# Patient Record
Sex: FEMALE | Race: BLACK OR AFRICAN AMERICAN | NOT HISPANIC OR LATINO
[De-identification: names, ages, dates, MRNs, and addresses within clinical notes are randomized per-mention and may not be internally consistent; named-entity substitution may affect disease eponyms.]

---

## 2017-01-05 ENCOUNTER — APPOINTMENT (OUTPATIENT)
Dept: OBGYN | Facility: CLINIC | Age: 47
End: 2017-01-05

## 2017-01-05 VITALS
OXYGEN SATURATION: 98 % | WEIGHT: 183 LBS | HEIGHT: 64 IN | HEART RATE: 72 BPM | RESPIRATION RATE: 16 BRPM | DIASTOLIC BLOOD PRESSURE: 72 MMHG | SYSTOLIC BLOOD PRESSURE: 110 MMHG | BODY MASS INDEX: 31.24 KG/M2

## 2017-01-17 ENCOUNTER — APPOINTMENT (OUTPATIENT)
Dept: SURGERY | Facility: CLINIC | Age: 47
End: 2017-01-17

## 2017-01-17 VITALS
DIASTOLIC BLOOD PRESSURE: 78 MMHG | HEIGHT: 64 IN | WEIGHT: 183 LBS | HEART RATE: 70 BPM | SYSTOLIC BLOOD PRESSURE: 119 MMHG | BODY MASS INDEX: 31.24 KG/M2 | RESPIRATION RATE: 15 BRPM

## 2017-01-28 ENCOUNTER — OUTPATIENT (OUTPATIENT)
Dept: OUTPATIENT SERVICES | Facility: HOSPITAL | Age: 47
LOS: 1 days | End: 2017-01-28
Payer: COMMERCIAL

## 2017-01-28 ENCOUNTER — APPOINTMENT (OUTPATIENT)
Dept: ULTRASOUND IMAGING | Facility: IMAGING CENTER | Age: 47
End: 2017-01-28

## 2017-01-28 DIAGNOSIS — Z00.8 ENCOUNTER FOR OTHER GENERAL EXAMINATION: ICD-10-CM

## 2017-01-28 PROCEDURE — 76882 US LMTD JT/FCL EVL NVASC XTR: CPT

## 2017-03-06 ENCOUNTER — OUTPATIENT (OUTPATIENT)
Dept: OUTPATIENT SERVICES | Facility: HOSPITAL | Age: 47
LOS: 1 days | End: 2017-03-06
Payer: COMMERCIAL

## 2017-03-06 VITALS
SYSTOLIC BLOOD PRESSURE: 122 MMHG | HEIGHT: 62.5 IN | TEMPERATURE: 98 F | HEART RATE: 67 BPM | DIASTOLIC BLOOD PRESSURE: 80 MMHG | WEIGHT: 184.09 LBS | RESPIRATION RATE: 16 BRPM

## 2017-03-06 DIAGNOSIS — D17.30 BENIGN LIPOMATOUS NEOPLASM OF SKIN AND SUBCUTANEOUS TISSUE OF UNSPECIFIED SITES: ICD-10-CM

## 2017-03-06 DIAGNOSIS — I26.99 OTHER PULMONARY EMBOLISM WITHOUT ACUTE COR PULMONALE: ICD-10-CM

## 2017-03-06 DIAGNOSIS — Z98.890 OTHER SPECIFIED POSTPROCEDURAL STATES: Chronic | ICD-10-CM

## 2017-03-06 DIAGNOSIS — K21.9 GASTRO-ESOPHAGEAL REFLUX DISEASE WITHOUT ESOPHAGITIS: ICD-10-CM

## 2017-03-06 LAB
BUN SERPL-MCNC: 15 MG/DL — SIGNIFICANT CHANGE UP (ref 7–23)
CALCIUM SERPL-MCNC: 9.5 MG/DL — SIGNIFICANT CHANGE UP (ref 8.4–10.5)
CHLORIDE SERPL-SCNC: 101 MMOL/L — SIGNIFICANT CHANGE UP (ref 98–107)
CO2 SERPL-SCNC: 23 MMOL/L — SIGNIFICANT CHANGE UP (ref 22–31)
CREAT SERPL-MCNC: 0.83 MG/DL — SIGNIFICANT CHANGE UP (ref 0.5–1.3)
GLUCOSE SERPL-MCNC: 101 MG/DL — HIGH (ref 70–99)
HCG SERPL-ACNC: < 5 MIU/ML — SIGNIFICANT CHANGE UP
HCT VFR BLD CALC: 40.3 % — SIGNIFICANT CHANGE UP (ref 34.5–45)
HGB BLD-MCNC: 13.5 G/DL — SIGNIFICANT CHANGE UP (ref 11.5–15.5)
MCHC RBC-ENTMCNC: 30.3 PG — SIGNIFICANT CHANGE UP (ref 27–34)
MCHC RBC-ENTMCNC: 33.5 % — SIGNIFICANT CHANGE UP (ref 32–36)
MCV RBC AUTO: 90.6 FL — SIGNIFICANT CHANGE UP (ref 80–100)
PLATELET # BLD AUTO: 297 K/UL — SIGNIFICANT CHANGE UP (ref 150–400)
PMV BLD: 11.6 FL — SIGNIFICANT CHANGE UP (ref 7–13)
POTASSIUM SERPL-MCNC: 3.9 MMOL/L — SIGNIFICANT CHANGE UP (ref 3.5–5.3)
POTASSIUM SERPL-SCNC: 3.9 MMOL/L — SIGNIFICANT CHANGE UP (ref 3.5–5.3)
RBC # BLD: 4.45 M/UL — SIGNIFICANT CHANGE UP (ref 3.8–5.2)
RBC # FLD: 12.8 % — SIGNIFICANT CHANGE UP (ref 10.3–14.5)
SODIUM SERPL-SCNC: 140 MMOL/L — SIGNIFICANT CHANGE UP (ref 135–145)
WBC # BLD: 6.39 K/UL — SIGNIFICANT CHANGE UP (ref 3.8–10.5)
WBC # FLD AUTO: 6.39 K/UL — SIGNIFICANT CHANGE UP (ref 3.8–10.5)

## 2017-03-06 PROCEDURE — 93010 ELECTROCARDIOGRAM REPORT: CPT

## 2017-03-06 NOTE — H&P PST ADULT - PROBLEM SELECTOR PLAN 1
Dilation Curettage Hysteroscopy, HTA Ablation 11/14/16.     CBC BMP PT/PTT HCG EKG    Pt to see PMD for clearance, requested on chart Scheduled for Excision lipoma of right lower back on 3/17/2017.  Preop instructions provided and pt verbalizes understanding.  Labs done and results pending.  Hibiclens provided with instructions.

## 2017-03-06 NOTE — H&P PST ADULT - PMH
Anemia  has been prescribed slow release iron but is non-compliant due to inability to tolerate  DVT (deep venous thrombosis)    GERD (gastroesophageal reflux disease)  no meds  Obesity    Pulmonary embolism, bilateral  2014.

## 2017-03-06 NOTE — H&P PST ADULT - FAMILY HISTORY
Father  Still living? No  Family history of heart disease, Age at diagnosis: Age Unknown     Mother  Still living? Yes, Estimated age: Age Unknown  Family history of diabetes mellitus, Age at diagnosis: Age Unknown

## 2017-03-06 NOTE — H&P PST ADULT - NSANTHOSAYNRD_GEN_A_CORE
No. DANIELLE screening performed.  STOP BANG Legend: 0-2 = LOW Risk; 3-4 = INTERMEDIATE Risk; 5-8 = HIGH Risk

## 2017-03-06 NOTE — H&P PST ADULT - ASSESSMENT
47 y/o female with history of GERD, PE/DVT (dx in Dec.2014). Pt with hx of fibroids x since age 24, heavy menstruation and anemia. Per pt fibroids had enlarged and she had been receiving Lupron injections, Fibroid decreased in size. Per pt, surgery will be performed so that fibroids do not enlarge when Lupron is discontinued.   Pt now scheduled for Dilation Curettage Hysteroscopy, HTA Ablation 11/14/16. Benign lipomatous neoplasm of skin and subcutaneous tissue

## 2017-03-06 NOTE — H&P PST ADULT - HISTORY OF PRESENT ILLNESS
45 y/o female with history of GERD, PE/DVT (dx in Dec.2014). Pt with hx of fibroids x since age 24, heavy menstruation and anemia. Per pt fibroids had enlarged and she had been receiving Lupron injections, Fibroid decreased in size. Per pt, surgery will be performed so that fibroids do not enlarge when Lupron is discontinued.   Pt now scheduled for Dilation Curettage Hysteroscopy, HTA Ablation 11/14/16. 45 y/o female with history of GERD, PE/DVT (dx in Dec.2014) and was treated with Warfarin until may 2016. Patient presents with preop dx of benign lipomatous neoplasm of skin and subcutaneous tissue presents to have PST eval for Excision lipoma of right lower back scheduled on 3/17/2017.

## 2017-03-06 NOTE — H&P PST ADULT - PSYCHIATRIC
details… Affect and characteristics of appearance, verbalizations, behaviors are appropriate detailed exam

## 2017-03-06 NOTE — H&P PST ADULT - RS GEN PE MLT RESP DETAILS PC
no rales/respirations non-labored/clear to auscultation bilaterally/no wheezes/no rhonchi clear to auscultation bilaterally/no rhonchi/breath sounds equal/no rales/respirations non-labored/airway patent/no wheezes

## 2017-03-06 NOTE — H&P PST ADULT - NEUROLOGICAL DETAILS
cranial nerves intact/alert and oriented x 3/sensation intact sensation intact/responds to pain/responds to verbal commands/alert and oriented x 3/cranial nerves intact

## 2017-03-16 ENCOUNTER — RESULT REVIEW (OUTPATIENT)
Age: 47
End: 2017-03-16

## 2017-03-17 ENCOUNTER — OUTPATIENT (OUTPATIENT)
Dept: OUTPATIENT SERVICES | Facility: HOSPITAL | Age: 47
LOS: 1 days | Discharge: HOME | End: 2017-03-17
Payer: COMMERCIAL

## 2017-03-17 VITALS
SYSTOLIC BLOOD PRESSURE: 101 MMHG | TEMPERATURE: 98 F | DIASTOLIC BLOOD PRESSURE: 49 MMHG | OXYGEN SATURATION: 98 % | HEART RATE: 68 BPM | RESPIRATION RATE: 16 BRPM

## 2017-03-17 VITALS
OXYGEN SATURATION: 100 % | HEIGHT: 62.5 IN | HEART RATE: 69 BPM | RESPIRATION RATE: 16 BRPM | WEIGHT: 184.09 LBS | DIASTOLIC BLOOD PRESSURE: 77 MMHG | TEMPERATURE: 99 F | SYSTOLIC BLOOD PRESSURE: 116 MMHG

## 2017-03-17 DIAGNOSIS — Z98.890 OTHER SPECIFIED POSTPROCEDURAL STATES: Chronic | ICD-10-CM

## 2017-03-17 DIAGNOSIS — D17.30 BENIGN LIPOMATOUS NEOPLASM OF SKIN AND SUBCUTANEOUS TISSUE OF UNSPECIFIED SITES: ICD-10-CM

## 2017-03-17 PROCEDURE — 88304 TISSUE EXAM BY PATHOLOGIST: CPT | Mod: 26

## 2017-03-17 PROCEDURE — 11406 EXC TR-EXT B9+MARG >4.0 CM: CPT | Mod: GC

## 2017-03-17 NOTE — ASU DISCHARGE PLAN (ADULT/PEDIATRIC). - NOTIFY
Numbness, color, or temperature change to extremity/Persistent Nausea and Vomiting/Pain not relieved by Medications/Swelling that continues/Unable to Urinate/Fever greater than 101/Bleeding that does not stop

## 2017-03-18 ENCOUNTER — TRANSCRIPTION ENCOUNTER (OUTPATIENT)
Age: 47
End: 2017-03-18

## 2017-03-20 ENCOUNTER — EMERGENCY (EMERGENCY)
Facility: HOSPITAL | Age: 47
LOS: 1 days | Discharge: HOME | End: 2017-03-20
Attending: EMERGENCY MEDICINE | Admitting: EMERGENCY MEDICINE
Payer: COMMERCIAL

## 2017-03-20 VITALS
HEART RATE: 59 BPM | RESPIRATION RATE: 18 BRPM | DIASTOLIC BLOOD PRESSURE: 61 MMHG | TEMPERATURE: 98 F | OXYGEN SATURATION: 99 % | SYSTOLIC BLOOD PRESSURE: 134 MMHG | WEIGHT: 184.97 LBS | HEIGHT: 64 IN

## 2017-03-20 VITALS
DIASTOLIC BLOOD PRESSURE: 88 MMHG | RESPIRATION RATE: 18 BRPM | OXYGEN SATURATION: 99 % | SYSTOLIC BLOOD PRESSURE: 138 MMHG | HEART RATE: 70 BPM

## 2017-03-20 DIAGNOSIS — Z98.890 OTHER SPECIFIED POSTPROCEDURAL STATES: Chronic | ICD-10-CM

## 2017-03-20 PROCEDURE — 99284 EMERGENCY DEPT VISIT MOD MDM: CPT | Mod: 25

## 2017-03-20 PROCEDURE — 93971 EXTREMITY STUDY: CPT | Mod: 26

## 2017-03-20 PROCEDURE — 93971 EXTREMITY STUDY: CPT

## 2017-03-20 PROCEDURE — 99284 EMERGENCY DEPT VISIT MOD MDM: CPT

## 2017-03-20 NOTE — ED PROVIDER NOTE - PHYSICAL EXAMINATION
Gen: NAD  Eyes:  sclerae white, no icterus  ENT: Moist mucous membranes. No exudates  Neck: supple, no LAD, mass or goiter, trachea midline  CV: RRR. Audible S1 and S2. No murmurs, rubs, gallops, S3, nor S4  Pulm: Clear to auscultation bilaterally. No wheezes, rales, or rhonchi  Abd: BS+, nondistended, No tenderness to palpation  Musculoskeletal:  L. calf TTP, not swollen, not erythematous  Skin: no lesions or scars noted  Psych: mood good, affect full range and congruent with mood.  Neurologic: AAOx3

## 2017-03-20 NOTE — ED PROVIDER NOTE - OBJECTIVE STATEMENT
47 y/o F w/ Hx of DVT and PE in 2014, not on anticoag, p/w L. calf pain and swelling x 3 days. No SOB, cough, CP, fever, or chills.

## 2017-03-20 NOTE — ED ADULT NURSE NOTE - OBJECTIVE STATEMENT
Per pt report, she has had left calf pain that she cannot walk on due to pain.  She has a history of DVT and PE two years ago.  She takes 162mg of asa to prevent this.  Left calf tender but no swelling or edema noted.  Pt A&Ox4, skin warm and dry, appears comfortable and is in no resp distress.

## 2017-03-20 NOTE — ED PROVIDER NOTE - ATTENDING CONTRIBUTION TO CARE
I performed a face to face evaluation of this patient and performed a history and physical examination on the patient.  I agree with the resident's history, physical examination, and plan of the patient. patient with h/o dvt and pe complaining of left calf swelling.  deneis chest pain and sob.  off anticoagulation.  concern for dvt.

## 2017-03-20 NOTE — ED PROVIDER NOTE - MEDICAL DECISION MAKING DETAILS
L. calf pain x 3 days; hx DVT/PE. No SOB, cough, or CP. No signs of infxn. DVT study neg. F/U study in 1 wk.

## 2017-03-22 LAB — SURGICAL PATHOLOGY STUDY: SIGNIFICANT CHANGE UP

## 2017-03-24 DIAGNOSIS — M79.662 PAIN IN LEFT LOWER LEG: ICD-10-CM

## 2017-04-14 ENCOUNTER — APPOINTMENT (OUTPATIENT)
Dept: OBGYN | Facility: CLINIC | Age: 47
End: 2017-04-14

## 2017-07-05 ENCOUNTER — APPOINTMENT (OUTPATIENT)
Dept: OBGYN | Facility: CLINIC | Age: 47
End: 2017-07-05

## 2017-07-05 VITALS
BODY MASS INDEX: 31.24 KG/M2 | HEART RATE: 66 BPM | WEIGHT: 183 LBS | HEIGHT: 64 IN | OXYGEN SATURATION: 98 % | DIASTOLIC BLOOD PRESSURE: 76 MMHG | SYSTOLIC BLOOD PRESSURE: 110 MMHG | RESPIRATION RATE: 16 BRPM

## 2017-07-05 DIAGNOSIS — Z01.419 ENCOUNTER FOR GYNECOLOGICAL EXAMINATION (GENERAL) (ROUTINE) W/OUT ABNORMAL FINDINGS: ICD-10-CM

## 2017-07-06 LAB
C TRACH RRNA SPEC QL NAA+PROBE: NORMAL
HPV HIGH+LOW RISK DNA PNL CVX: NEGATIVE
N GONORRHOEA RRNA SPEC QL NAA+PROBE: NORMAL
SOURCE TP AMPLIFICATION: NORMAL

## 2017-07-09 LAB — CYTOLOGY CVX/VAG DOC THIN PREP: NORMAL

## 2017-07-30 ENCOUNTER — FORM ENCOUNTER (OUTPATIENT)
Age: 47
End: 2017-07-30

## 2017-07-31 ENCOUNTER — OUTPATIENT (OUTPATIENT)
Dept: OUTPATIENT SERVICES | Facility: HOSPITAL | Age: 47
LOS: 1 days | End: 2017-07-31
Payer: COMMERCIAL

## 2017-07-31 ENCOUNTER — APPOINTMENT (OUTPATIENT)
Dept: MRI IMAGING | Facility: IMAGING CENTER | Age: 47
End: 2017-07-31
Payer: COMMERCIAL

## 2017-07-31 DIAGNOSIS — D25.9 LEIOMYOMA OF UTERUS, UNSPECIFIED: ICD-10-CM

## 2017-07-31 DIAGNOSIS — Z98.890 OTHER SPECIFIED POSTPROCEDURAL STATES: Chronic | ICD-10-CM

## 2017-07-31 PROCEDURE — 72197 MRI PELVIS W/O & W/DYE: CPT | Mod: 26

## 2017-07-31 PROCEDURE — A9585: CPT

## 2017-07-31 PROCEDURE — 72197 MRI PELVIS W/O & W/DYE: CPT

## 2017-08-09 ENCOUNTER — APPOINTMENT (OUTPATIENT)
Dept: OBGYN | Facility: CLINIC | Age: 47
End: 2017-08-09
Payer: COMMERCIAL

## 2017-08-09 VITALS
HEART RATE: 72 BPM | RESPIRATION RATE: 16 BRPM | BODY MASS INDEX: 29.88 KG/M2 | OXYGEN SATURATION: 98 % | SYSTOLIC BLOOD PRESSURE: 102 MMHG | DIASTOLIC BLOOD PRESSURE: 76 MMHG | WEIGHT: 175 LBS | HEIGHT: 64 IN

## 2017-08-09 DIAGNOSIS — D17.30 BENIGN LIPOMATOUS NEOPLASM OF SKIN AND SUBCUTANEOUS TISSUE OF UNSPECIFIED SITES: ICD-10-CM

## 2017-08-09 DIAGNOSIS — Z98.890 OTHER SPECIFIED POSTPROCEDURAL STATES: ICD-10-CM

## 2017-08-09 DIAGNOSIS — Z86.018 PERSONAL HISTORY OF OTHER BENIGN NEOPLASM: ICD-10-CM

## 2017-08-09 LAB
HCG UR QL: NEGATIVE
QUALITY CONTROL: YES

## 2017-08-09 PROCEDURE — 58100 BIOPSY OF UTERUS LINING: CPT

## 2017-08-09 PROCEDURE — 81025 URINE PREGNANCY TEST: CPT

## 2017-08-09 PROCEDURE — 99214 OFFICE O/P EST MOD 30 MIN: CPT | Mod: 25

## 2017-08-11 LAB — CORE LAB BIOPSY: NORMAL

## 2017-10-02 ENCOUNTER — APPOINTMENT (OUTPATIENT)
Dept: OBGYN | Facility: HOSPITAL | Age: 47
End: 2017-10-02

## 2017-10-25 ENCOUNTER — APPOINTMENT (OUTPATIENT)
Dept: OBGYN | Facility: CLINIC | Age: 47
End: 2017-10-25

## 2017-11-06 ENCOUNTER — APPOINTMENT (OUTPATIENT)
Dept: OBGYN | Facility: CLINIC | Age: 47
End: 2017-11-06
Payer: COMMERCIAL

## 2017-11-06 ENCOUNTER — ASOB RESULT (OUTPATIENT)
Age: 47
End: 2017-11-06

## 2017-11-06 PROCEDURE — 76830 TRANSVAGINAL US NON-OB: CPT

## 2017-11-20 ENCOUNTER — OUTPATIENT (OUTPATIENT)
Dept: OUTPATIENT SERVICES | Facility: HOSPITAL | Age: 47
LOS: 1 days | End: 2017-11-20
Payer: COMMERCIAL

## 2017-11-20 VITALS
DIASTOLIC BLOOD PRESSURE: 70 MMHG | HEIGHT: 62.5 IN | WEIGHT: 177.03 LBS | TEMPERATURE: 98 F | SYSTOLIC BLOOD PRESSURE: 120 MMHG | HEART RATE: 72 BPM | RESPIRATION RATE: 16 BRPM

## 2017-11-20 DIAGNOSIS — Z98.890 OTHER SPECIFIED POSTPROCEDURAL STATES: Chronic | ICD-10-CM

## 2017-11-20 DIAGNOSIS — I82.409 ACUTE EMBOLISM AND THROMBOSIS OF UNSPECIFIED DEEP VEINS OF UNSPECIFIED LOWER EXTREMITY: ICD-10-CM

## 2017-11-20 DIAGNOSIS — D25.9 LEIOMYOMA OF UTERUS, UNSPECIFIED: ICD-10-CM

## 2017-11-20 DIAGNOSIS — N92.0 EXCESSIVE AND FREQUENT MENSTRUATION WITH REGULAR CYCLE: ICD-10-CM

## 2017-11-20 LAB
APPEARANCE UR: CLEAR — SIGNIFICANT CHANGE UP
BASOPHILS # BLD AUTO: 0.06 K/UL — SIGNIFICANT CHANGE UP (ref 0–0.2)
BASOPHILS NFR BLD AUTO: 1 % — SIGNIFICANT CHANGE UP (ref 0–2)
BILIRUB UR-MCNC: NEGATIVE — SIGNIFICANT CHANGE UP
BLD GP AB SCN SERPL QL: NEGATIVE — SIGNIFICANT CHANGE UP
BLOOD UR QL VISUAL: NEGATIVE — SIGNIFICANT CHANGE UP
BUN SERPL-MCNC: 13 MG/DL — SIGNIFICANT CHANGE UP (ref 7–23)
CALCIUM SERPL-MCNC: 8.7 MG/DL — SIGNIFICANT CHANGE UP (ref 8.4–10.5)
CHLORIDE SERPL-SCNC: 101 MMOL/L — SIGNIFICANT CHANGE UP (ref 98–107)
CO2 SERPL-SCNC: 24 MMOL/L — SIGNIFICANT CHANGE UP (ref 22–31)
COLOR SPEC: SIGNIFICANT CHANGE UP
CREAT SERPL-MCNC: 0.79 MG/DL — SIGNIFICANT CHANGE UP (ref 0.5–1.3)
EOSINOPHIL # BLD AUTO: 0.11 K/UL — SIGNIFICANT CHANGE UP (ref 0–0.5)
EOSINOPHIL NFR BLD AUTO: 1.8 % — SIGNIFICANT CHANGE UP (ref 0–6)
GLUCOSE SERPL-MCNC: 99 MG/DL — SIGNIFICANT CHANGE UP (ref 70–99)
GLUCOSE UR-MCNC: NEGATIVE — SIGNIFICANT CHANGE UP
HCT VFR BLD CALC: 39.8 % — SIGNIFICANT CHANGE UP (ref 34.5–45)
HGB BLD-MCNC: 12.6 G/DL — SIGNIFICANT CHANGE UP (ref 11.5–15.5)
IMM GRANULOCYTES # BLD AUTO: 0.01 # — SIGNIFICANT CHANGE UP
IMM GRANULOCYTES NFR BLD AUTO: 0.2 % — SIGNIFICANT CHANGE UP (ref 0–1.5)
KETONES UR-MCNC: NEGATIVE — SIGNIFICANT CHANGE UP
LEUKOCYTE ESTERASE UR-ACNC: NEGATIVE — SIGNIFICANT CHANGE UP
LYMPHOCYTES # BLD AUTO: 2.8 K/UL — SIGNIFICANT CHANGE UP (ref 1–3.3)
LYMPHOCYTES # BLD AUTO: 46.1 % — HIGH (ref 13–44)
MCHC RBC-ENTMCNC: 29 PG — SIGNIFICANT CHANGE UP (ref 27–34)
MCHC RBC-ENTMCNC: 31.7 % — LOW (ref 32–36)
MCV RBC AUTO: 91.7 FL — SIGNIFICANT CHANGE UP (ref 80–100)
MONOCYTES # BLD AUTO: 0.58 K/UL — SIGNIFICANT CHANGE UP (ref 0–0.9)
MONOCYTES NFR BLD AUTO: 9.5 % — SIGNIFICANT CHANGE UP (ref 2–14)
MUCOUS THREADS # UR AUTO: SIGNIFICANT CHANGE UP
NEUTROPHILS # BLD AUTO: 2.52 K/UL — SIGNIFICANT CHANGE UP (ref 1.8–7.4)
NEUTROPHILS NFR BLD AUTO: 41.4 % — LOW (ref 43–77)
NITRITE UR-MCNC: NEGATIVE — SIGNIFICANT CHANGE UP
NRBC # FLD: 0 — SIGNIFICANT CHANGE UP
PH UR: 6 — SIGNIFICANT CHANGE UP (ref 4.6–8)
PLATELET # BLD AUTO: 340 K/UL — SIGNIFICANT CHANGE UP (ref 150–400)
PMV BLD: 11.2 FL — SIGNIFICANT CHANGE UP (ref 7–13)
POTASSIUM SERPL-MCNC: 3.9 MMOL/L — SIGNIFICANT CHANGE UP (ref 3.5–5.3)
POTASSIUM SERPL-SCNC: 3.9 MMOL/L — SIGNIFICANT CHANGE UP (ref 3.5–5.3)
PROT UR-MCNC: NEGATIVE — SIGNIFICANT CHANGE UP
RBC # BLD: 4.34 M/UL — SIGNIFICANT CHANGE UP (ref 3.8–5.2)
RBC # FLD: 12.6 % — SIGNIFICANT CHANGE UP (ref 10.3–14.5)
RBC CASTS # UR COMP ASSIST: SIGNIFICANT CHANGE UP (ref 0–?)
RH IG SCN BLD-IMP: POSITIVE — SIGNIFICANT CHANGE UP
SODIUM SERPL-SCNC: 139 MMOL/L — SIGNIFICANT CHANGE UP (ref 135–145)
SP GR SPEC: 1.01 — SIGNIFICANT CHANGE UP (ref 1–1.03)
SQUAMOUS # UR AUTO: SIGNIFICANT CHANGE UP
UROBILINOGEN FLD QL: NORMAL E.U. — SIGNIFICANT CHANGE UP (ref 0.1–0.2)
WBC # BLD: 6.08 K/UL — SIGNIFICANT CHANGE UP (ref 3.8–10.5)
WBC # FLD AUTO: 6.08 K/UL — SIGNIFICANT CHANGE UP (ref 3.8–10.5)

## 2017-11-20 PROCEDURE — 93010 ELECTROCARDIOGRAM REPORT: CPT

## 2017-11-20 NOTE — H&P PST ADULT - PMH
Anemia  has been prescribed slow release iron but is non-compliant due to inability to tolerate  DVT (deep venous thrombosis)  LLE 2014  hematologist work up was negative for clotting disorder"  GERD (gastroesophageal reflux disease)    Obesity    Pulmonary embolism, bilateral  2014.  Uterine fibroid

## 2017-11-20 NOTE — H&P PST ADULT - NEGATIVE MUSCULOSKELETAL SYMPTOMS
no joint swelling/no muscle weakness/no arm pain R/no leg pain R/no leg pain L/no myalgia/no neck pain/no muscle cramps/no stiffness/no arthralgia/no arm pain L/no back pain

## 2017-11-20 NOTE — H&P PST ADULT - NEGATIVE PSYCHIATRIC SYMPTOMS
no suicidal ideation/no depression/no anxiety/no insomnia/no visual hallucinations/no auditory hallucinations

## 2017-11-20 NOTE — H&P PST ADULT - NEGATIVE GENERAL GENITOURINARY SYMPTOMS
no bladder infections/no flank pain L/no flank pain R/no dysuria/normal urinary frequency/no hematuria

## 2017-11-20 NOTE — H&P PST ADULT - NEGATIVE CARDIOVASCULAR SYMPTOMS
no dyspnea on exertion/no chest pain/no palpitations/no peripheral edema/no orthopnea/no paroxysmal nocturnal dyspnea/no claudication

## 2017-11-20 NOTE — H&P PST ADULT - LYMPHATIC
anterior cervical R/posterior cervical L/anterior cervical L/supraclavicular R/supraclavicular L/posterior cervical R

## 2017-11-20 NOTE — H&P PST ADULT - FAMILY HISTORY
Father  Still living? No  Family history of heart disease, Age at diagnosis: Age Unknown     Mother  Still living? Yes, Estimated age: Age Unknown  Family history of diabetes mellitus, Age at diagnosis: Age Unknown     Sibling  Still living? Yes, Estimated age: Age Unknown  Family history of cancer, Age at diagnosis: Age Unknown     Aunt  Still living? No  Family history of cancer, Age at diagnosis: Age Unknown     Uncle  Still living? No  Family history of throat cancer, Age at diagnosis: Age Unknown

## 2017-11-20 NOTE — H&P PST ADULT - NEGATIVE NEUROLOGICAL SYMPTOMS
no weakness/no paresthesias/no generalized seizures/no vertigo/no loss of sensation/no difficulty walking/no transient paralysis

## 2017-11-20 NOTE — H&P PST ADULT - HISTORY OF PRESENT ILLNESS
46 y/o female with history of GERD, PE/DVT (dx in Dec.2014) and was treated with Warfarin until may 2016, currently on aspirin therapy. Pt reports she has been being monitored for uterine fibroid for over 15 years.  Pt reports she experiences intermittent menorrhagia and dysmenorrhea.   Pt reports last vaginal ultrasound done Nov 2017.  Pt is scheduled for laparoscopic total hysterectomy cystoscopy on 12/4/17. 46 y/o female with history of GERD, PE/DVT (dx in Dec.2014) and was treated with Warfarin until may 2016, currently on aspirin therapy. Pt reports she has been being monitored for a uterine fibroid for over 15 years.  Pt reports she experiences intermittent menorrhagia and dysmenorrhea.   Pt reports last vaginal ultrasound done Nov 2017, showed that uterine fibroid has increased in size.  Pt is scheduled for laparoscopic total hysterectomy cystoscopy on 12/4/17. 46 y/o female with history of GERD, PE/DVT (dx in Dec.2014) and was treated with Warfarin until may 2016, currently on aspirin therapy. Pt reports she has been being monitored for a uterine fibroid for over 15 years.  Pt reports she experiences intermittent menorrhagia and dysmenorrhea.   Pt reports last vaginal ultrasound done Nov 2017, showed that uterine fibroid has increased in size.  Pt is scheduled for laparoscopic supracervical hysterectomy,bilateral salpingectomy cystoscopy on 12/4/17.

## 2017-11-20 NOTE — H&P PST ADULT - RS GEN PE MLT RESP DETAILS PC
respirations non-labored/airway patent/breath sounds equal/no rhonchi/no wheezes/good air movement/clear to auscultation bilaterally/no rales/no intercostal retractions

## 2017-11-20 NOTE — H&P PST ADULT - MUSCULOSKELETAL
details… detailed exam normal strength/no joint swelling/no joint erythema/no joint warmth/no calf tenderness/ROM intact

## 2017-11-20 NOTE — H&P PST ADULT - ATTENDING COMMENTS
Pt requests hysterectomy for management of myomatous utersu.Daily QOL affected significantly.Recent endom sampling --benign and pap/HPV neg.Pt c/o menorrhagia and pelvic pian 2014 LLE DVT with tiesha PE--heme w/u neg.pt aware R/B/side effects ,compls proc incldg but not limited to RECURRENT PE and death from that,infection/Abscess,bleeding/trasnsfusion,risk reop for hemoperiit or other surgical requirement--bowel obst/hernia, injury to surrounding organs--GI//Vascular as well as fistula requiring further surgery as well as resulting kidney damage or loss.Pt to receive Lovenox px postop x 7 days and preop px heparin as per standard of care for h/o previous VTE.Pt to have MIS as she has opted--poss laparotomy.Pt to have LSCH/BS with poss cystoscopy.All querstions have been abnswered to pt satisfaction--ample time as well as office visits to discuss with pt as well

## 2017-11-20 NOTE — H&P PST ADULT - PROBLEM SELECTOR PLAN 1
Pt is scheduled for laparoscopic total hysterectomy cystoscopy on 12/4/17.   Pre op instructions including chlorhexidine wash given and pt able to verbalize understanding.   Sterile cup given, pt instructed to bring urine sample AM of surgery for UCG and pt able to verbalize understanding.

## 2017-11-20 NOTE — H&P PST ADULT - NEGATIVE GENERAL SYMPTOMS
no fever/no weight gain/no polyphagia/no polyuria/no chills/no anorexia/no polydipsia/no weight loss/no sweating

## 2017-11-20 NOTE — H&P PST ADULT - PSH
H/O laparoscopy  1999 for ectopic pregnancy  H/O prior ablation treatment  2016 uterine  S/P excision of lipoma  lower back 2016  S/P knee surgery  right knee arthroscopy 11/30/2011

## 2017-11-20 NOTE — H&P PST ADULT - PROBLEM SELECTOR PLAN 2
Pt obtained hematology evaluation preop as instructed by Dr. Sullivan, will request evaluation letter.  Pt instructed by hematologist to continue aspirin therapy preop.

## 2017-11-22 ENCOUNTER — APPOINTMENT (OUTPATIENT)
Dept: OBGYN | Facility: CLINIC | Age: 47
End: 2017-11-22
Payer: COMMERCIAL

## 2017-11-22 LAB
BACTERIA UR CULT: SIGNIFICANT CHANGE UP
SPECIMEN SOURCE: SIGNIFICANT CHANGE UP

## 2017-11-22 PROCEDURE — 99214 OFFICE O/P EST MOD 30 MIN: CPT

## 2017-11-22 RX ORDER — AMOXICILLIN 500 MG/1
500 CAPSULE ORAL
Qty: 21 | Refills: 0 | Status: ACTIVE | COMMUNITY
Start: 2017-06-02

## 2017-12-04 ENCOUNTER — INPATIENT (INPATIENT)
Facility: HOSPITAL | Age: 47
LOS: 0 days | Discharge: HOME | End: 2017-12-05
Attending: OBSTETRICS & GYNECOLOGY | Admitting: OBSTETRICS & GYNECOLOGY
Payer: COMMERCIAL

## 2017-12-04 ENCOUNTER — APPOINTMENT (OUTPATIENT)
Dept: OBGYN | Facility: HOSPITAL | Age: 47
End: 2017-12-04

## 2017-12-04 ENCOUNTER — RESULT REVIEW (OUTPATIENT)
Age: 47
End: 2017-12-04

## 2017-12-04 VITALS
RESPIRATION RATE: 17 BRPM | TEMPERATURE: 98 F | HEIGHT: 62.5 IN | SYSTOLIC BLOOD PRESSURE: 123 MMHG | HEART RATE: 57 BPM | OXYGEN SATURATION: 99 % | WEIGHT: 177.03 LBS | DIASTOLIC BLOOD PRESSURE: 73 MMHG

## 2017-12-04 DIAGNOSIS — D25.9 LEIOMYOMA OF UTERUS, UNSPECIFIED: ICD-10-CM

## 2017-12-04 DIAGNOSIS — Z98.890 OTHER SPECIFIED POSTPROCEDURAL STATES: Chronic | ICD-10-CM

## 2017-12-04 DIAGNOSIS — N92.0 EXCESSIVE AND FREQUENT MENSTRUATION WITH REGULAR CYCLE: ICD-10-CM

## 2017-12-04 LAB
GLUCOSE BLDC GLUCOMTR-MCNC: 79 MG/DL — SIGNIFICANT CHANGE UP (ref 70–99)
HCT VFR BLD CALC: 35.4 % — SIGNIFICANT CHANGE UP (ref 34.5–45)
HGB BLD-MCNC: 11.6 G/DL — SIGNIFICANT CHANGE UP (ref 11.5–15.5)
MCHC RBC-ENTMCNC: 29.4 PG — SIGNIFICANT CHANGE UP (ref 27–34)
MCHC RBC-ENTMCNC: 32.8 % — SIGNIFICANT CHANGE UP (ref 32–36)
MCV RBC AUTO: 89.8 FL — SIGNIFICANT CHANGE UP (ref 80–100)
NRBC # FLD: 0 — SIGNIFICANT CHANGE UP
PLATELET # BLD AUTO: 271 K/UL — SIGNIFICANT CHANGE UP (ref 150–400)
PMV BLD: SIGNIFICANT CHANGE UP FL (ref 7–13)
RBC # BLD: 3.94 M/UL — SIGNIFICANT CHANGE UP (ref 3.8–5.2)
RBC # FLD: SIGNIFICANT CHANGE UP % (ref 10.3–14.5)
WBC # BLD: 11.11 K/UL — HIGH (ref 3.8–10.5)
WBC # FLD AUTO: 11.11 K/UL — HIGH (ref 3.8–10.5)

## 2017-12-04 PROCEDURE — 88305 TISSUE EXAM BY PATHOLOGIST: CPT | Mod: 26

## 2017-12-04 PROCEDURE — 88304 TISSUE EXAM BY PATHOLOGIST: CPT | Mod: 26

## 2017-12-04 PROCEDURE — 88307 TISSUE EXAM BY PATHOLOGIST: CPT | Mod: 26

## 2017-12-04 RX ORDER — SIMETHICONE 80 MG/1
80 TABLET, CHEWABLE ORAL
Qty: 0 | Refills: 0 | Status: DISCONTINUED | OUTPATIENT
Start: 2017-12-04 | End: 2017-12-04

## 2017-12-04 RX ORDER — SIMETHICONE 80 MG/1
80 TABLET, CHEWABLE ORAL
Qty: 0 | Refills: 0 | Status: DISCONTINUED | OUTPATIENT
Start: 2017-12-04 | End: 2017-12-05

## 2017-12-04 RX ORDER — HEPARIN SODIUM 5000 [USP'U]/ML
5000 INJECTION INTRAVENOUS; SUBCUTANEOUS EVERY 8 HOURS
Qty: 0 | Refills: 0 | Status: DISCONTINUED | OUTPATIENT
Start: 2017-12-04 | End: 2017-12-05

## 2017-12-04 RX ORDER — HEPARIN SODIUM 5000 [USP'U]/ML
5000 INJECTION INTRAVENOUS; SUBCUTANEOUS EVERY 8 HOURS
Qty: 0 | Refills: 0 | Status: DISCONTINUED | OUTPATIENT
Start: 2017-12-04 | End: 2017-12-04

## 2017-12-04 RX ORDER — KETOROLAC TROMETHAMINE 30 MG/ML
15 SYRINGE (ML) INJECTION EVERY 6 HOURS
Qty: 0 | Refills: 0 | Status: DISCONTINUED | OUTPATIENT
Start: 2017-12-04 | End: 2017-12-05

## 2017-12-04 RX ORDER — HEPARIN SODIUM 5000 [USP'U]/ML
5000 INJECTION INTRAVENOUS; SUBCUTANEOUS ONCE
Qty: 0 | Refills: 0 | Status: COMPLETED | OUTPATIENT
Start: 2017-12-04 | End: 2017-12-04

## 2017-12-04 RX ORDER — PANTOPRAZOLE SODIUM 20 MG/1
40 TABLET, DELAYED RELEASE ORAL
Qty: 0 | Refills: 0 | Status: DISCONTINUED | OUTPATIENT
Start: 2017-12-04 | End: 2017-12-05

## 2017-12-04 RX ORDER — ACETAMINOPHEN 500 MG
975 TABLET ORAL EVERY 6 HOURS
Qty: 0 | Refills: 0 | Status: DISCONTINUED | OUTPATIENT
Start: 2017-12-04 | End: 2017-12-05

## 2017-12-04 RX ORDER — ACETAMINOPHEN 500 MG
975 TABLET ORAL EVERY 6 HOURS
Qty: 0 | Refills: 0 | Status: DISCONTINUED | OUTPATIENT
Start: 2017-12-04 | End: 2017-12-04

## 2017-12-04 RX ORDER — DOCUSATE SODIUM 100 MG
100 CAPSULE ORAL
Qty: 0 | Refills: 0 | Status: DISCONTINUED | OUTPATIENT
Start: 2017-12-04 | End: 2017-12-05

## 2017-12-04 RX ORDER — KETOROLAC TROMETHAMINE 30 MG/ML
15 SYRINGE (ML) INJECTION EVERY 6 HOURS
Qty: 0 | Refills: 0 | Status: DISCONTINUED | OUTPATIENT
Start: 2017-12-04 | End: 2017-12-04

## 2017-12-04 RX ORDER — SODIUM CHLORIDE 9 MG/ML
1000 INJECTION, SOLUTION INTRAVENOUS
Qty: 0 | Refills: 0 | Status: DISCONTINUED | OUTPATIENT
Start: 2017-12-04 | End: 2017-12-04

## 2017-12-04 RX ORDER — PANTOPRAZOLE SODIUM 20 MG/1
40 TABLET, DELAYED RELEASE ORAL
Qty: 0 | Refills: 0 | Status: DISCONTINUED | OUTPATIENT
Start: 2017-12-04 | End: 2017-12-04

## 2017-12-04 RX ORDER — PHENAZOPYRIDINE HCL 100 MG
200 TABLET ORAL ONCE
Qty: 0 | Refills: 0 | Status: COMPLETED | OUTPATIENT
Start: 2017-12-04 | End: 2017-12-04

## 2017-12-04 RX ORDER — SODIUM CHLORIDE 9 MG/ML
1000 INJECTION, SOLUTION INTRAVENOUS
Qty: 0 | Refills: 0 | Status: DISCONTINUED | OUTPATIENT
Start: 2017-12-04 | End: 2017-12-05

## 2017-12-04 RX ORDER — DOCUSATE SODIUM 100 MG
100 CAPSULE ORAL
Qty: 0 | Refills: 0 | Status: DISCONTINUED | OUTPATIENT
Start: 2017-12-04 | End: 2017-12-04

## 2017-12-04 RX ORDER — INFLUENZA VIRUS VACCINE 15; 15; 15; 15 UG/.5ML; UG/.5ML; UG/.5ML; UG/.5ML
0.5 SUSPENSION INTRAMUSCULAR ONCE
Qty: 0 | Refills: 0 | Status: DISCONTINUED | OUTPATIENT
Start: 2017-12-04 | End: 2017-12-05

## 2017-12-04 RX ADMIN — HEPARIN SODIUM 5000 UNIT(S): 5000 INJECTION INTRAVENOUS; SUBCUTANEOUS at 23:08

## 2017-12-04 RX ADMIN — HEPARIN SODIUM 5000 UNIT(S): 5000 INJECTION INTRAVENOUS; SUBCUTANEOUS at 11:56

## 2017-12-04 RX ADMIN — Medication 975 MILLIGRAM(S): at 23:08

## 2017-12-04 RX ADMIN — SODIUM CHLORIDE 125 MILLILITER(S): 9 INJECTION, SOLUTION INTRAVENOUS at 23:09

## 2017-12-04 RX ADMIN — Medication 200 MILLIGRAM(S): at 12:10

## 2017-12-04 NOTE — BRIEF OPERATIVE NOTE - OPERATION/FINDINGS
16 week sized uterus, multiple myomas, 5 cm posterior HECTOR fibroid removed separately. grossly normal appendix. no gross disease noted in abdomen. grossly normal ovaries bilaterally 16 week sized uterus, multiple myomas, 5 cm posterior HECTOR fibroid removed separately. grossly normal appendix. no gross disease noted in abdomen. grossly normal ovaries bilaterally. bilateral ureteral jets noted on cystoscopy, bladder dome intact

## 2017-12-04 NOTE — PATIENT PROFILE ADULT. - HEALTH/HEALTHCARE ANXIETIES, PROFILE
Schedule for OV.   We will repeat thyroid US in Dec 2018 (1.5yrs since the last one).    will address md

## 2017-12-04 NOTE — BRIEF OPERATIVE NOTE - POST-OP DX
Dysfunctional uterine bleeding  12/04/2017    Active  Jeremias Bynum  Myoma  12/04/2017    Active  Jeremias Bynum

## 2017-12-04 NOTE — ASU PREOP CHECKLIST - PATIENT SENT TO
I called the patient back after speaking to dr Elba Mcmahan and he is refilling her pain medication. I told her our office was closing today at 3 pm and she needs her phot ID when she pick its up. Pt in agreement. operating room

## 2017-12-04 NOTE — PROGRESS NOTE ADULT - SUBJECTIVE AND OBJECTIVE BOX
POD#0    R2 Post Op Check    Patient seen and examined at bedside.  No acute complaints, pain well controlled.  Patient is tolerating clears. Has not yet passed flatus. Marshall is still in place. Denies CP, SOB, N/V, fevers, and chills.    Vital Signs Last 24 Hours  T(C): 36.4 (12-04-17 @ 16:10), Max: 36.6 (12-04-17 @ 09:59)  HR: 61 (12-04-17 @ 17:30) (57 - 67)  BP: 107/56 (12-04-17 @ 17:30) (107/56 - 134/67)  RR: 14 (12-04-17 @ 17:30) (11 - 24)  SpO2: 94% (12-04-17 @ 17:30) (94% - 99%)    I&O's Summary    04 Dec 2017 07:01  -  04 Dec 2017 19:53  --------------------------------------------------------  IN: 640 mL / OUT: 135 mL / NET: 505 mL        Physical Exam:  General: NAD  CV: NR, RR, S1, S2, no M/R/G  Lungs: CTA-B  Abdomen: Soft, non-tender, non-distended, +BS  Incision: CDI  Ext: No pain or swelling    Labs:      MEDICATIONS  (STANDING):  acetaminophen   Tablet. 975 milliGRAM(s) Oral every 6 hours  docusate sodium 100 milliGRAM(s) Oral two times a day  influenza   Vaccine 0.5 milliLiter(s) IntraMuscular once  lactated ringers. 1000 milliLiter(s) (125 mL/Hr) IV Continuous <Continuous>  pantoprazole    Tablet 40 milliGRAM(s) Oral before breakfast  simethicone 80 milliGRAM(s) Chew two times a day    MEDICATIONS  (PRN):  ketorolac   Injectable 15 milliGRAM(s) IV Push every 6 hours PRN Moderate Pain (4 - 6)

## 2017-12-04 NOTE — PROGRESS NOTE ADULT - ASSESSMENT
48yo POD#0 s/p LSC Converted to Supracervical Hysterectomy, Bilateral Salpingectomy, Cysto. Patient is stable    Neuro: PCA for pain control  CV: Hemodynamically stable. F/u 8pm CBC  Pulm: Saturating well on room air, encourage incentive spirometry  GI: Tolerating clears. Advance as tolerated  : Marshall in Place/   Heme: c/w HSQ and SCDs for DVT ppx  Dispo: Continue routine post-op care    JEFF Carreon, PGY2 48yo POD#0 s/p LSC Converted to Supracervical Hysterectomy, Bilateral Salpingectomy, Cysto. Patient is stable    Neuro: Pain controlled on IV meds  CV: Hemodynamically stable. F/u 8pm CBC  Pulm: Saturating well on room air, encourage incentive spirometry  GI: Tolerating clears. Advance as tolerated  : Marshall in Place/   Heme: c/w HSQ and SCDs for DVT ppx  Dispo: Continue routine post-op care    JEFF Carreon, PGY2

## 2017-12-04 NOTE — BRIEF OPERATIVE NOTE - PROCEDURE
<<-----Click on this checkbox to enter Procedure Myomectomy  12/04/2017    Active  RYAN  Supracervical hysterectomy  12/04/2017    Active  RYAN  Salpingectomy, bilateral  12/04/2017    Active  RYAN  Cystoscopy  12/04/2017    Active  RYAN Myomectomy  12/04/2017    Active  Interfaces, RTIF  Salpingectomy, bilateral  12/04/2017    Active  Interfaces, RTIF  Supracervical hysterectomy  12/04/2017    Active  Interfaces, RTIF  Cystoscopy  12/04/2017    Active  Interfaces, RTIF

## 2017-12-05 ENCOUNTER — TRANSCRIPTION ENCOUNTER (OUTPATIENT)
Age: 47
End: 2017-12-05

## 2017-12-05 VITALS
OXYGEN SATURATION: 100 % | TEMPERATURE: 100 F | DIASTOLIC BLOOD PRESSURE: 66 MMHG | SYSTOLIC BLOOD PRESSURE: 113 MMHG | RESPIRATION RATE: 18 BRPM | HEART RATE: 68 BPM

## 2017-12-05 DIAGNOSIS — D25.9 LEIOMYOMA OF UTERUS, UNSPECIFIED: ICD-10-CM

## 2017-12-05 LAB
HCT VFR BLD CALC: 33.3 % — LOW (ref 34.5–45)
HGB BLD-MCNC: 10.9 G/DL — LOW (ref 11.5–15.5)
MCHC RBC-ENTMCNC: 29.5 PG — SIGNIFICANT CHANGE UP (ref 27–34)
MCHC RBC-ENTMCNC: 32.7 % — SIGNIFICANT CHANGE UP (ref 32–36)
MCV RBC AUTO: 90 FL — SIGNIFICANT CHANGE UP (ref 80–100)
NRBC # FLD: 0 — SIGNIFICANT CHANGE UP
PLATELET # BLD AUTO: 277 K/UL — SIGNIFICANT CHANGE UP (ref 150–400)
PMV BLD: 10.4 FL — SIGNIFICANT CHANGE UP (ref 7–13)
RBC # BLD: 3.7 M/UL — LOW (ref 3.8–5.2)
RBC # FLD: 12.9 % — SIGNIFICANT CHANGE UP (ref 10.3–14.5)
WBC # BLD: 9.53 K/UL — SIGNIFICANT CHANGE UP (ref 3.8–10.5)
WBC # FLD AUTO: 9.53 K/UL — SIGNIFICANT CHANGE UP (ref 3.8–10.5)

## 2017-12-05 RX ORDER — IBUPROFEN 200 MG
600 TABLET ORAL EVERY 6 HOURS
Qty: 0 | Refills: 0 | Status: DISCONTINUED | OUTPATIENT
Start: 2017-12-05 | End: 2017-12-05

## 2017-12-05 RX ORDER — OXYCODONE HYDROCHLORIDE 5 MG/1
5 TABLET ORAL
Qty: 0 | Refills: 0 | Status: DISCONTINUED | OUTPATIENT
Start: 2017-12-05 | End: 2017-12-05

## 2017-12-05 RX ORDER — FOLIC ACID/VIT B COMPLEX AND C 400 MCG
1 TABLET ORAL
Qty: 0 | Refills: 0 | COMMUNITY

## 2017-12-05 RX ORDER — ENOXAPARIN SODIUM 100 MG/ML
40 INJECTION SUBCUTANEOUS DAILY
Qty: 0 | Refills: 0 | Status: DISCONTINUED | OUTPATIENT
Start: 2017-12-05 | End: 2017-12-05

## 2017-12-05 RX ORDER — LANOLIN ALCOHOL/MO/W.PET/CERES
1 CREAM (GRAM) TOPICAL
Qty: 0 | Refills: 0 | COMMUNITY

## 2017-12-05 RX ORDER — ENOXAPARIN SODIUM 100 MG/ML
0 INJECTION SUBCUTANEOUS
Qty: 0 | Refills: 0 | COMMUNITY

## 2017-12-05 RX ORDER — ACETAMINOPHEN 500 MG
2 TABLET ORAL
Qty: 60 | Refills: 0 | OUTPATIENT
Start: 2017-12-05 | End: 2017-12-10

## 2017-12-05 RX ORDER — CHOLECALCIFEROL (VITAMIN D3) 125 MCG
1 CAPSULE ORAL
Qty: 0 | Refills: 0 | COMMUNITY

## 2017-12-05 RX ORDER — ASPIRIN/CALCIUM CARB/MAGNESIUM 324 MG
2 TABLET ORAL
Qty: 0 | Refills: 0 | COMMUNITY

## 2017-12-05 RX ADMIN — HEPARIN SODIUM 5000 UNIT(S): 5000 INJECTION INTRAVENOUS; SUBCUTANEOUS at 07:32

## 2017-12-05 RX ADMIN — Medication 975 MILLIGRAM(S): at 06:34

## 2017-12-05 RX ADMIN — Medication 975 MILLIGRAM(S): at 13:52

## 2017-12-05 RX ADMIN — SIMETHICONE 80 MILLIGRAM(S): 80 TABLET, CHEWABLE ORAL at 06:34

## 2017-12-05 RX ADMIN — Medication 100 MILLIGRAM(S): at 06:34

## 2017-12-05 RX ADMIN — ENOXAPARIN SODIUM 40 MILLIGRAM(S): 100 INJECTION SUBCUTANEOUS at 13:53

## 2017-12-05 RX ADMIN — Medication 975 MILLIGRAM(S): at 00:10

## 2017-12-05 RX ADMIN — Medication 975 MILLIGRAM(S): at 07:25

## 2017-12-05 RX ADMIN — PANTOPRAZOLE SODIUM 40 MILLIGRAM(S): 20 TABLET, DELAYED RELEASE ORAL at 06:34

## 2017-12-05 RX ADMIN — Medication 600 MILLIGRAM(S): at 13:53

## 2017-12-05 NOTE — DISCHARGE NOTE ADULT - CARE PLAN
Principal Discharge DX:	Uterine leiomyoma, unspecified location  Goal:	Return to baseline level of activity  Instructions for follow-up, activity and diet:	Regular diet as tolerated, regular activity as tolerated, no heavy lifting for first two weeks.  Nothing per vagina: no intercourse, tampons or douching.  Call your provider if you experience fevers, chills, worsening abdominal pain, inability to urinate or worsening vaginal bleeding.  Follow up with your provider in 2 weeks.

## 2017-12-05 NOTE — DISCHARGE NOTE ADULT - CONDITIONS AT DISCHARGE
pt. comfortable,  v/s stable afebrile.  voiding, ambulating, tolerating diet.  pt pain well managed.  no signs of acute distress

## 2017-12-05 NOTE — PROGRESS NOTE ADULT - SUBJECTIVE AND OBJECTIVE BOX
Gyn R4 POD#1    Patient seen at bedside. No acute complaints. Denies any CP/SOB, N/V, fevers/chills. Tolerating clears. Not OOB. No flatus. Pain controlled with PO analgesia and toradol.     Vital Signs Last 24 Hours  T(C): 36.9 (12-05-17 @ 06:33), Max: 37.2 (12-05-17 @ 01:45)  HR: 67 (12-05-17 @ 06:33) (57 - 78)  BP: 103/51 (12-05-17 @ 06:33) (103/51 - 134/67)  RR: 18 (12-05-17 @ 06:33) (11 - 24)  SpO2: 100% (12-05-17 @ 06:33) (94% - 100%)    I&O's Summary    04 Dec 2017 07:01  -  05 Dec 2017 06:42  --------------------------------------------------------  IN: 2580 mL / OUT: 1635 mL / NET: 945 mL      Physical Exam:  General: NAD  CV: RRR  Lungs: CTA-B  Abdomen: Soft, non-tender, non-distended, +BS  Incision: CDI  Ext: No pain or swelling    Labs:                        11.6   11.11 )-----------( 271      ( 04 Dec 2017 21:00 )             35.4   baso x      eos x      imm gran x      lymph x      mono x      poly x          MEDICATIONS  (STANDING):  acetaminophen   Tablet. 975 milliGRAM(s) Oral every 6 hours  docusate sodium 100 milliGRAM(s) Oral two times a day  heparin  Injectable 5000 Unit(s) SubCutaneous every 8 hours  influenza   Vaccine 0.5 milliLiter(s) IntraMuscular once  lactated ringers. 1000 milliLiter(s) (125 mL/Hr) IV Continuous <Continuous>  pantoprazole    Tablet 40 milliGRAM(s) Oral before breakfast  simethicone 80 milliGRAM(s) Chew two times a day    MEDICATIONS  (PRN):  ketorolac   Injectable 15 milliGRAM(s) IV Push every 6 hours PRN Moderate Pain (4 - 6)

## 2017-12-05 NOTE — DISCHARGE NOTE ADULT - PLAN OF CARE
Return to baseline level of activity Regular diet as tolerated, regular activity as tolerated, no heavy lifting for first two weeks.  Nothing per vagina: no intercourse, tampons or douching.  Call your provider if you experience fevers, chills, worsening abdominal pain, inability to urinate or worsening vaginal bleeding.  Follow up with your provider in 2 weeks.

## 2017-12-05 NOTE — DISCHARGE NOTE ADULT - HOSPITAL COURSE
48yo POD#1 s/p LSC converted to Ex Lap YOAN, BS, Cysto. , H/H as below. Patient’s postoperative course was unremarkable and she remained hemodynamically stable and afebrile throughout. Upon discharge on POD#1, the patient is ambulating and voiding spontaneously, tolerating oral intake, pain was well controlled with oral medication, and vital signs were stable.               10.9   9.53  )-----------( 277      ( 12-05 @ 05:40 )             33.3                11.6   11.11 )-----------( 271      ( 12-04 @ 21:00 )             35.4

## 2017-12-05 NOTE — PROGRESS NOTE ADULT - SUBJECTIVE AND OBJECTIVE BOX
ANESTHESIA POSTOP CHECK    47y Female POSTOP DAY 1 S/P     [ X] NO APPARENT ANESTHESIA COMPLICATIONS      Comments:

## 2017-12-05 NOTE — DISCHARGE NOTE ADULT - PATIENT PORTAL LINK FT
“You can access the [employer] Patient Portal, offered by [facility], by registering with the following website: [email or web address]”

## 2017-12-05 NOTE — PROGRESS NOTE ADULT - PROBLEM SELECTOR PLAN 1
Neuro: transition to po pain meds, currently pain well controlled  CV: Hemodynamically stable. Monitor VS. Review AM CBC  Pulm: Saturating well on room air, encourage oob/amb  GI: Advance to regular diet as tolerated. Jacob Ayala  : Joanna removed. DTV  Heme: c/w HSQ, once CBC reviewed, plan to transition to Lovenox due to patients history of DVT/PE. Continue SCDs  Dispo: Continue routine post-op care    Misa PGY 4

## 2017-12-05 NOTE — DISCHARGE NOTE ADULT - INSTRUCTIONS
Notify MD of fever, chills, nausea, vomiting, pain unrelieved by pain meds, difficulty urinating, inability to tolerate food.  discharge from incision sites

## 2017-12-05 NOTE — DISCHARGE NOTE ADULT - CARE PROVIDER_API CALL
Lorraine Sullivan), Obstetrics and Gynecology  [facility]  [address] [place] [address]  Phone: [phone]  Fax: [phone]

## 2017-12-07 LAB — SURGICAL PATHOLOGY STUDY: SIGNIFICANT CHANGE UP

## 2017-12-27 ENCOUNTER — APPOINTMENT (OUTPATIENT)
Dept: OBGYN | Facility: CLINIC | Age: 47
End: 2017-12-27
Payer: COMMERCIAL

## 2017-12-27 VITALS
SYSTOLIC BLOOD PRESSURE: 116 MMHG | HEIGHT: 64 IN | RESPIRATION RATE: 18 BRPM | OXYGEN SATURATION: 99 % | HEART RATE: 94 BPM | BODY MASS INDEX: 29.88 KG/M2 | WEIGHT: 175 LBS | DIASTOLIC BLOOD PRESSURE: 72 MMHG

## 2017-12-27 DIAGNOSIS — Z98.890 OTHER SPECIFIED POSTPROCEDURAL STATES: ICD-10-CM

## 2017-12-27 DIAGNOSIS — Z09 ENCOUNTER FOR FOLLOW-UP EXAMINATION AFTER COMPLETED TREATMENT FOR CONDITIONS OTHER THAN MALIGNANT NEOPLASM: ICD-10-CM

## 2017-12-27 DIAGNOSIS — D21.9 BENIGN NEOPLASM OF CONNECTIVE AND OTHER SOFT TISSUE, UNSPECIFIED: ICD-10-CM

## 2017-12-27 DIAGNOSIS — R10.2 PELVIC AND PERINEAL PAIN: ICD-10-CM

## 2017-12-27 DIAGNOSIS — Z87.42 PERSONAL HISTORY OF OTHER DISEASES OF THE FEMALE GENITAL TRACT: ICD-10-CM

## 2017-12-27 PROCEDURE — 99024 POSTOP FOLLOW-UP VISIT: CPT

## 2017-12-27 RX ORDER — ENOXAPARIN SODIUM 100 MG/ML
40 INJECTION SUBCUTANEOUS DAILY
Qty: 7 | Refills: 0 | Status: COMPLETED | COMMUNITY
Start: 2017-11-22 | End: 2017-12-27

## 2018-10-18 NOTE — ASU PREOP CHECKLIST - BLOOD AVAILABLE
[facility] [place]    Physical Therapy  Cancellation/No-show Note  Patient Name:  Angelique Kang  :  1976   Date:  10/18/2018  Cancelled visits to date: 0  No-shows to date: 2    For today's appointment patient:  []  Cancelled  []  Rescheduled appointment  [x]  No-show     Reason given by patient:  []  Patient ill  []  Conflicting appointment  []  No transportation    []  Conflict with work  [x]  No reason given  []  Other:     Comments:  Called and left message with pt    Electronically signed by:  Luh Rosales PTA n/a

## 2019-05-10 ENCOUNTER — RESULT REVIEW (OUTPATIENT)
Age: 49
End: 2019-05-10

## 2019-06-10 NOTE — H&P PST ADULT - HIV STATUS
PREOPERATIVE HISTORY AND PHYSICAL     Chief Complaint   Patient presents with   • Pre-Op Exam     6/25/19 Dr. Covarrubias       SUBJECTIVE:   The patient is a 63 year old female coming in today for a preoperative history and physical examination.     HISTORY OF PRESENT ILLNESS:   She is scheduled to have cervical conization.  She is scheduled for this surgery on 6/25/2019 by Dr. Covarrubias, who has requested this preoperative history and physical examination on this patient. A copy of this report will be sent to Dr. Covarrubias.    She denies any concerns today. No chest pain, trouble breathing, wheezing, lightheadedness, dizziness, palpations, any syncope, or near syncope. No fever, chills, nausea, vomiting. All other cardiovascular, respiratory, gastrointestinal, genitourinary, neurologic, musculoskeletal and all other systems are reviewed and are negative.     PROBLEM LIST/PAST MEDICAL HISTORY:  Patient Active Problem List   Diagnosis   • HTN (hypertension)   • Hyperlipidemia   • Calculus of ureter   • Calcium oxalate kidney stones   • BRYSON (stress urinary incontinence, female)   • High grade squamous intraepithelial cervical dysplasia     Negative for heart disease, kidney disease, liver disease, diabetes, thyroid disease, bleeding disorders or blood clots.    PAST SURGICAL HISTORY:    CYSTOSCOPY,INSERT URETERAL STENT                7/27/15         Comment: Dr. Graves    CYSTOURETHROSCOPY W/REM CALCUL                  7/27/15         Comment: Dr. Graves  Denies any problems or complications with prior surgeries or anesthesia.    ALLERGIES:  No Known Allergies    Current Outpatient Medications   Medication Sig   • valsartan (DIOVAN) 320 MG tablet TAKE 1 TABLET BY MOUTH DAILY   • Vitamin D, Ergocalciferol, 94502 units capsule TAKE 1 CAPSULE BY MOUTH EVERY 30 DAYS   • lovastatin (MEVACOR) 20 MG tablet TAKE 0.5 TABLETS BY MOUTH NIGHTLY   • Omega-3 Fatty Acids (FISH OIL) 1000 MG capsule Take 2 g by mouth daily.       No current  facility-administered medications for this visit.      Denies any history of oral Prednisone use in the last 12 months.    SOCIAL HISTORY:  Social History     Tobacco Use   • Smoking status: Former Smoker     Packs/day: 0.50     Years: 10.00     Pack years: 5.00     Types: Cigarettes     Last attempt to quit: 2006     Years since quittin.8   • Smokeless tobacco: Never Used   Substance Use Topics   • Alcohol use: No     Alcohol/week: 0.0 oz       Family History   Problem Relation Age of Onset   • Cancer Mother    • Hypertension Mother    • Diabetes Father      No family history of any problems or complications with prior surgeries or anesthesia. Family history negative for bleeding disorder or blood clots.    REVIEW OF SYSTEMS:   The rest of the cardiovascular, respiratory, gastrointestinal, genitourinary, neurologic, musculoskeletal and all other systems are reviewed and are negative.     PERIOPERATIVE CARDIAC RISK ASSESSMENT:   The patient does not have any history of coronary revascularization/cardiac catheterization in the past. No history of any stress test in the past.     On patient's functional assessment, patient denies any history of chest pain/shortness of breath if climbing a flight of stairs or walking uphill. Denies any history of chest pain or trouble breathing if patient has to walk for 3-4 miles. Denies any history of chest pain or trouble breathing while doing any kind of heavy housework, including scrubbing, lifting, pulling, pushing or moving furniture. Denies any history of chest pain, trouble breathing with yard work including whacking weeds, weeding or pushing a power mower, cutting grass or shoveling snow. The patient is otherwise active and is completely asymptomatic at this point of time. Denies history of palpitations, dizziness, light-headedness, any passing out spells or fainting. Denies any history of chest pain, unstable angina or any history of myocardial infarction in the  past.     PHYSICAL EXAMINATION:   VITAL SIGNS:   Visit Vitals  /88 (BP Location: [facility], Patient Position: Sitting, Cuff Size: Large Adult)   Pulse 83   Temp 98.6 °F (37 °C) (Oral)   Wt 115.1 kg   SpO2 98%   BMI 39.74 kg/m²     GENERAL: Comfortable, in no acute distress.   HEENT: Head normocephalic, atraumatic. Pupils equal, reactive to light and accommodation. Tympanic membranes normal bilaterally. Oropharynx: Moist mucous membranes. Lips and dentition normal. Nose patent. No deformities.   NECK: Supple. No cervical lymphadenopathy, thyromegaly or bruit. No supraclavicular lymphadenopathy.   CHEST: Clear to auscultation. Normal respiratory effort.   CARDIOVASCULAR: Regular rate and rhythm, S1 plus S2.   NEUROLOGIC: Conscious, alert, oriented x3. Judgment and insight good.   ABDOMEN: Soft, nontender, no rebound tenderness, guarding or rigidity. Bowel sounds positive. No hernias.   BACK: No costovertebral angle tenderness.   EXTREMITIES: Reflexes 2+ bilaterally both upper and lower extremities. Strength 5/5 bilaterally both upper and lower extremities. Sensation intact. Gait normal. No other sensory or motor deficits on exam.   MUSCULOSKELETAL: Normal.   VASCULAR: Normal.   SKIN: Overlying skin normal on inspection, warm and dry.     ASSESSMENT AND PLAN:     1. Preoperative history and physical examination for cervical conization by Dr. Covarrubias on 6/25/2019. The patient is completely asymptomatic.   2. Labs CBC, CMP,   3. Chest x-ray: Not indicated.    4. EKG - NSR  5. Prediabetes - Check HbA1c today   6. Hypertension - Borderline controlled. Continue 320mg Valsartan and return in a week for blood pressure check.   7. Testing/ recommendations as above, this patient is optimized for the planned procedure and can proceed as planned.     Adamaris Horan MD         Negative/Unknown

## 2019-12-10 ENCOUNTER — EMERGENCY (EMERGENCY)
Facility: HOSPITAL | Age: 49
LOS: 1 days | Discharge: HOME | End: 2019-12-10
Attending: EMERGENCY MEDICINE
Payer: COMMERCIAL

## 2019-12-10 VITALS
HEART RATE: 54 BPM | SYSTOLIC BLOOD PRESSURE: 122 MMHG | OXYGEN SATURATION: 99 % | TEMPERATURE: 98 F | DIASTOLIC BLOOD PRESSURE: 85 MMHG | RESPIRATION RATE: 18 BRPM

## 2019-12-10 VITALS
RESPIRATION RATE: 19 BRPM | TEMPERATURE: 98 F | WEIGHT: 184.97 LBS | SYSTOLIC BLOOD PRESSURE: 119 MMHG | HEART RATE: 71 BPM | OXYGEN SATURATION: 100 % | HEIGHT: 64 IN | DIASTOLIC BLOOD PRESSURE: 70 MMHG

## 2019-12-10 DIAGNOSIS — Z98.890 OTHER SPECIFIED POSTPROCEDURAL STATES: Chronic | ICD-10-CM

## 2019-12-10 PROBLEM — D25.9 LEIOMYOMA OF UTERUS, UNSPECIFIED: Chronic | Status: ACTIVE | Noted: 2017-11-20

## 2019-12-10 LAB
ALBUMIN SERPL ELPH-MCNC: 4.6 G/DL — SIGNIFICANT CHANGE UP (ref 3.3–5)
ALP SERPL-CCNC: 72 U/L — SIGNIFICANT CHANGE UP (ref 40–120)
ALT FLD-CCNC: 14 U/L — SIGNIFICANT CHANGE UP (ref 10–45)
ANION GAP SERPL CALC-SCNC: 12 MMOL/L — SIGNIFICANT CHANGE UP (ref 5–17)
AST SERPL-CCNC: 20 U/L — SIGNIFICANT CHANGE UP (ref 10–40)
BASOPHILS # BLD AUTO: 0.04 K/UL — SIGNIFICANT CHANGE UP (ref 0–0.2)
BASOPHILS NFR BLD AUTO: 0.9 % — SIGNIFICANT CHANGE UP (ref 0–2)
BILIRUB SERPL-MCNC: 0.3 MG/DL — SIGNIFICANT CHANGE UP (ref 0.2–1.2)
BUN SERPL-MCNC: 8 MG/DL — SIGNIFICANT CHANGE UP (ref 7–23)
CALCIUM SERPL-MCNC: 9.7 MG/DL — SIGNIFICANT CHANGE UP (ref 8.4–10.5)
CHLORIDE SERPL-SCNC: 102 MMOL/L — SIGNIFICANT CHANGE UP (ref 96–108)
CO2 SERPL-SCNC: 27 MMOL/L — SIGNIFICANT CHANGE UP (ref 22–31)
CREAT SERPL-MCNC: 0.78 MG/DL — SIGNIFICANT CHANGE UP (ref 0.5–1.3)
D DIMER BLD IA.RAPID-MCNC: 215 NG/ML DDU — SIGNIFICANT CHANGE UP
EOSINOPHIL # BLD AUTO: 0.11 K/UL — SIGNIFICANT CHANGE UP (ref 0–0.5)
EOSINOPHIL NFR BLD AUTO: 2.4 % — SIGNIFICANT CHANGE UP (ref 0–6)
GLUCOSE SERPL-MCNC: 52 MG/DL — LOW (ref 70–99)
HCT VFR BLD CALC: 42.3 % — SIGNIFICANT CHANGE UP (ref 34.5–45)
HGB BLD-MCNC: 13.5 G/DL — SIGNIFICANT CHANGE UP (ref 11.5–15.5)
IMM GRANULOCYTES NFR BLD AUTO: 0.2 % — SIGNIFICANT CHANGE UP (ref 0–1.5)
LYMPHOCYTES # BLD AUTO: 2.17 K/UL — SIGNIFICANT CHANGE UP (ref 1–3.3)
LYMPHOCYTES # BLD AUTO: 46.4 % — HIGH (ref 13–44)
MCHC RBC-ENTMCNC: 29 PG — SIGNIFICANT CHANGE UP (ref 27–34)
MCHC RBC-ENTMCNC: 31.9 GM/DL — LOW (ref 32–36)
MCV RBC AUTO: 90.8 FL — SIGNIFICANT CHANGE UP (ref 80–100)
MONOCYTES # BLD AUTO: 0.51 K/UL — SIGNIFICANT CHANGE UP (ref 0–0.9)
MONOCYTES NFR BLD AUTO: 10.9 % — SIGNIFICANT CHANGE UP (ref 2–14)
NEUTROPHILS # BLD AUTO: 1.84 K/UL — SIGNIFICANT CHANGE UP (ref 1.8–7.4)
NEUTROPHILS NFR BLD AUTO: 39.2 % — LOW (ref 43–77)
NRBC # BLD: 0 /100 WBCS — SIGNIFICANT CHANGE UP (ref 0–0)
PLATELET # BLD AUTO: 351 K/UL — SIGNIFICANT CHANGE UP (ref 150–400)
POTASSIUM SERPL-MCNC: 4.5 MMOL/L — SIGNIFICANT CHANGE UP (ref 3.5–5.3)
POTASSIUM SERPL-SCNC: 4.5 MMOL/L — SIGNIFICANT CHANGE UP (ref 3.5–5.3)
PROT SERPL-MCNC: 8.2 G/DL — SIGNIFICANT CHANGE UP (ref 6–8.3)
RBC # BLD: 4.66 M/UL — SIGNIFICANT CHANGE UP (ref 3.8–5.2)
RBC # FLD: 12.3 % — SIGNIFICANT CHANGE UP (ref 10.3–14.5)
SODIUM SERPL-SCNC: 141 MMOL/L — SIGNIFICANT CHANGE UP (ref 135–145)
WBC # BLD: 4.68 K/UL — SIGNIFICANT CHANGE UP (ref 3.8–10.5)
WBC # FLD AUTO: 4.68 K/UL — SIGNIFICANT CHANGE UP (ref 3.8–10.5)

## 2019-12-10 PROCEDURE — 99284 EMERGENCY DEPT VISIT MOD MDM: CPT

## 2019-12-10 PROCEDURE — 99283 EMERGENCY DEPT VISIT LOW MDM: CPT | Mod: 25

## 2019-12-10 PROCEDURE — 71046 X-RAY EXAM CHEST 2 VIEWS: CPT

## 2019-12-10 PROCEDURE — 85379 FIBRIN DEGRADATION QUANT: CPT

## 2019-12-10 PROCEDURE — 80053 COMPREHEN METABOLIC PANEL: CPT

## 2019-12-10 PROCEDURE — 85027 COMPLETE CBC AUTOMATED: CPT

## 2019-12-10 PROCEDURE — 71046 X-RAY EXAM CHEST 2 VIEWS: CPT | Mod: 26

## 2019-12-10 NOTE — ED ADULT TRIAGE NOTE - CHIEF COMPLAINT QUOTE
spitting blood once last wk and this morning, abd pain x 4 days ago, difficulty swallowing x 2 weeks (hx gerd, denies fever, no recent long distance travel)

## 2019-12-10 NOTE — ED PROVIDER NOTE - PHYSICAL EXAMINATION
GENERAL: Patient awake alert NAD.  HEENT: NC/AT, Moist mucous membranes, no post-nasal bleed, no bleed in b/l nares.  LUNGS: CTAB, no wheezes or crackles.   CARDIAC: RRR, no m/r/g.    ABDOMEN: Soft, NT, ND, No rebound, guarding. No CVA tenderness.   EXT: No edema. No calf tenderness. CV 2+DP/PT bilaterally.   MSK: No spinal tenderness, no pain with movement, no deformities.  NEURO: A&Ox3. Moving all extremities.  SKIN: Warm and dry. No rash.  PSYCH: Normal affect.

## 2019-12-10 NOTE — ED PROVIDER NOTE - NS ED ROS FT
General: denies fever, chills, weight loss/weight gain.  HENT: +hemoptysis; denies nasal congestion, sore throat, rhinorrhea, ear pain  Eyes: denies visual changes, blurred vision, eye discharge, eye redness  Neck: denies neck pain, neck swelling  CV: denies chest pain, palpitations  Resp: denies difficulty breathing, cough  Abdominal: denies nausea, vomiting, diarrhea, abdominal pain, blood in stool, dark stool  MSK: denies muscle aches, bony pain, leg pain, leg swelling  Neuro: denies headaches, numbness, tingling, dizziness, lightheadedness.  Skin: denies rashes, cuts, bruises  Hematologic: denies unexplained bruises

## 2019-12-10 NOTE — ED PROVIDER NOTE - PATIENT PORTAL LINK FT
You can access the FollowMyHealth Patient Portal offered by [facility] by registering at the following website: [email or web address]. By joining [employer]’s FollowMyHealth portal, you will also be able to view your health information using other applications (apps) compatible with our system.

## 2019-12-10 NOTE — ED PROVIDER NOTE - NS ED ATTENDING STATEMENT MOD
none I have personally seen and examined this patient.  I have fully participated in the care of this patient. I have reviewed all pertinent clinical information, including history, physical exam, plan and the Resident’s note and agree except as noted.

## 2019-12-10 NOTE — ED PROVIDER NOTE - PROGRESS NOTE DETAILS
Kevin PGY1 - Discussed all results w/ pt., who understands them.  Pt. will f/u w/ GI.  All other questions and concerns have been addressed.  Pt. will be discharged.

## 2019-12-10 NOTE — ED PROVIDER NOTE - OBJECTIVE STATEMENT
Pt. is a 49 y.o. F w/ PMHx of GERD on omeprazole 40mg once a day, previous D Pt. is a 49 y.o. F w/ PMHx of GERD on omeprazole 40mg once a day, previous DVT and PE p/w two episodes of hemoptysis in one week.  Pt. states she spit up 1 teaspoon of blood last week Pt. is a 49 y.o. F w/ PMHx of GERD on omeprazole 40mg once a day, previous DVT and PE p/w two episodes of hemoptysis in one week.  Pt. states she spit up 1 teaspoon of blood last week and then once again today morning.  Pt. denies any cough, fevers, URI symptoms, CP, SOB, leg swelling.  Pt. had an endoscopy done this past August which was unremarkable.  Per pt., the omeprazole does not seem to be working as pt. still has acid reflux.  Pt. also had a 2-hour episode of abdominal pain on Friday that self-resolved w/o any meds.  Pt. denies any vaginal discharge, hx of STD/STI, vaginal bleeding.

## 2019-12-10 NOTE — ED PROVIDER NOTE - CARE PLAN
Principal Discharge DX:	GERD (gastroesophageal reflux disease)  Secondary Diagnosis:	Hemoptysis Principal Discharge DX:	GERD (gastroesophageal reflux disease)  Secondary Diagnosis:	Blood in sputum

## 2019-12-10 NOTE — ED PROVIDER NOTE - CLINICAL SUMMARY MEDICAL DECISION MAKING FREE TEXT BOX
Pt. is a 49 y.o. F p/w two episodes of hemoptysis in one week.  Pt. is extremely well-appearing and denies CP, SOB, and has stable vitals.  Will get dimer considering hx of DVT and PE although clinical suspicion for PE remains low.  Will get basics to evaluate for drop in h/h and electrolyte abnormalities. Will get CXR to evaluate for pneumomediastinum.  If all results are negative, will d/c pt. w/ GI f/u.

## 2019-12-10 NOTE — ED PROVIDER NOTE - ATTENDING CONTRIBUTION TO CARE
49 y.o. F w/ PMHx of GERD on omeprazole 40mg once a day, previous DVT and PE p/w two episodes of spitting up blood once last week and a little today less then a cap full with sputum, no bleeding here, heent nl, vss, not on ac, labs, cxr nl for outpt follow up with gi sts, no blood per rectum as per patient.

## 2019-12-10 NOTE — ED PROVIDER NOTE - NSFOLLOWUPINSTRUCTIONS_ED_ALL_ED_FT
You were seen for spitting up blood.  Your blood work and CXR are unremarkable.    Please make sure to follow up with your GI doctor in the next 3-5 days and bring a copy of your results.  You may need an adjustment in your dosage of omeprazole.    If you start vomiting blood, feel lightheaded, lose consciousness, can't tolerate any food, or have any other concerning symptoms, please seek immediate medical attention.

## 2020-12-10 NOTE — ED ADULT NURSE NOTE - NSHOSCREENINGQ1_ED_ALL_ED
There are no preventive care reminders to display for this patient.    Patient is up to date, no discussion needed.           No

## 2021-09-24 NOTE — ASU PATIENT PROFILE, ADULT - PATIENT KNOW
[facility]  History and Physical  Hospitalist       Date of Admission:  9/23/2021    Assessment & Plan   Gem Gama is a 71 year old female with coronary artery disease, ischemic cardiomyopathy, dyslipidemia, history of ST elevation MI, CKD stage III, LEA, GERD, depression, anxiety who was admitted to observation on 9/23/2021 for treatment of urinary tract infection.    UTI  Presented with several days of dysuria and then developed fever.  Febrile to 103 F but normal WBC and no hypotension or tachycardia.  Very weak in the emergency department and did not feel safe going home.  Urinalysis with positive nitrates, large leukocyte esterase, WBC 56, few bacteria.  -continue ceftriaxone pending urine & blood culture and sensitivities  -antipyretic as needed (acetamionophen)  -antiemetic as needed (zofran, compazine)  -Continue IV fluids  -Regular diet as tolerated    H/o STEMI  CAD  Ischemic Cardiomyopathy  Dyslipidemia  Initial complaint on arrival to the emergency department included chest pain, which has since resolved.  Reassuringly, troponins negative x2 and EKG was nonischemic.  Chest x-ray normal.  -Will not continue further troponin checks    Headache  Improved after IV fluids and initiation of antibiotics.  Suspect some mild dehydration and infection contributing.  No neck stiffness or vision changes.  Head CT without any acute intracranial process.  -Supportive care    Chronic, stable conditions:  CKD stage 3  Depression, Anxiety  LEA  GERD    Asymptomatic Rule Out of COVID-19 infection  This patient was evaluated during a global COVID-19 pandemic, which necessitated consideration that the patient might be at risk for infection with the SARS-CoV-2 virus that causes COVID-19. Applicable protocols for evaluation were followed during the patient's care. Low suspicion for infection.   -follow-up COVID-19 PCR test result => Negative    Clinically Significant Risk Factors Present on  Admission          # Hypocalcemia: Ca = 8.1 mg/dL (Ref range: 8.5 - 10.1 mg/dL) and/or iCa = N/A on admission, will replace as needed     # Platelet Defect: home medication list includes an antiplatelet medication      DVT prophylaxis: Pneumatic Compression Devices and Ambulate every shift  Code Status: Full    Disposition: Expected discharge in 1-2 days.  The patient is boarding the entire night in the emergency department and unclear when she will get an observation bed in the hospital.  Potentially could discharge from the emergency department if she is feeling better later today.    Luisa Luong MD  Text Page    ~~~~~~~~~~~~~~~~~~~~~~~~~~~~~~~~~~~~~~~~~~~~~~~~~~~~~  Primary Care Physician   Danny Balbuena Vocal    Chief Complaint   Headache, chest pain, dysuria    History is obtained from the patient and medical records.    History of Present Illness   Gem Gama is a very pleasant 71 year old female with coronary artery disease, ischemic cardiomyopathy, dyslipidemia, history of ST elevation MI, CKD stage III, LEA, GERD, depression, anxiety who presents with several days of dysuria.  Her initial triage complaint was of some chest pain and headache.  The headache was associated around the time frame of the urinary symptoms but given the chest pain that she noticed and her history of coronary artery disease she decided to come to the emergency department.  Feverish at home up to 103  F.  Denies any vision changes, loss of consciousness, neck stiffness.  She has not noticed any blood or clots in her urine.  Denies shortness of breath, cough, recent travel or ill contacts.  Denies abdominal pain, nausea, vomiting. Case reviewed with Dr. Ramirez from the Emergency Department. Labs and imaging reviewed.     Past Medical History    I have reviewed this patient's medical history and updated it with pertinent information if needed.   Past Medical History:   Diagnosis Date     ADHD (attention deficit hyperactivity  disorder)      Anxiety      Arthritis     back, hands, knees, hips     Asthma     controlled--has coughing symptoms     C. difficile diarrhea      Central sleep apnea      Coronary artery disease involving native coronary artery of native heart without angina pectoris 10/25/2019     Depression      Gastro-oesophageal reflux disease      Hypertension      Ischemic cardiomyopathy 10/25/2019     Narcolepsy      Renal disease     gfr is aroung 48     Sleep apnea     uses Cpap     Past Surgical History   I have reviewed this patient's surgical history and updated it with pertinent information if needed.  Past Surgical History:   Procedure Laterality Date     ARTHROPLASTY KNEE  7/9/2014    Procedure: ARTHROPLASTY KNEE;  Surgeon: Levi Johnson MD;  Location:  OR     ARTHROPLASTY KNEE Left 11/24/2014    Procedure: ARTHROPLASTY KNEE;  Surgeon: Levi Johnson MD;  Location:  OR     C REIMPLANT URETER,EXTENSIVE TAILORING  1973 1997     C REPAIR ENTEROCELE,VAG APPRCH  2008    Prolift     C TOTAL ABD HYSTERECTOMY+BLAD REPR  1992    Vaginal approach with oophrectomy     C TOTAL KNEE ARTHROPLASTY       CV CORONARY ANGIOGRAM N/A 10/9/2019    Procedure: Coronary Angiogram;  Surgeon: Chiquita Chatterjee MD;  Location:  HEART CARDIAC CATH LAB     CV HEART CATHETERIZATION WITH POSSIBLE INTERVENTION N/A 10/10/2019    Procedure: Heart Catheterization with Possible Intervention;  Surgeon: Chin Garcia MD;  Location:  HEART CARDIAC CATH LAB     CV INTRA AORTIC BALLOON N/A 10/9/2019    Procedure: Intra-Aortic Balloon Pump Insertion;  Surgeon: Chiquita Chatterjee MD;  Location:  HEART CARDIAC CATH LAB     CV INTRA AORTIC BALLOON REMOVAL N/A 10/10/2019    Procedure: Intra-Aortic Balloon Pump Removal;  Surgeon: Chin Garcia MD;  Location:  HEART CARDIAC CATH LAB     CV LEFT HEART CATH N/A 12/11/2020    Procedure: Heart Catheterization with Possible Intervention;  Surgeon: Pilo Otoole MD;  Location:   HEART CARDIAC CATH LAB     CV PCI STENT DRUG ELUTING N/A 10/9/2019    Procedure: PCI Stent Drug Eluting;  Surgeon: Chiquita Chatterjee MD;  Location:  HEART CARDIAC CATH LAB     GENITOURINARY SURGERY      kidney surgery X 3     ORTHOPEDIC SURGERY      bilat total hip     RECTOCELE REPAIR       SOFT TISSUE SURGERY         Prior to Admission Medications   Prior to Admission Medications   Prescriptions Last Dose Informant Patient Reported? Taking?   Melatonin-Pyridoxine (MELATIN PO)   Yes No   Sig: Take 1 tablet by mouth nightly as needed (sleep)    Prenatal Multivit-Min-Fe-FA (PRENATAL/IRON) TABS   Yes No   amoxicillin (AMOXIL) 500 MG capsule   Yes No   Sig: Take as directed before dental work   aspirin (ASA) 81 MG EC tablet   No No   Sig: Take 1 tablet (81 mg) by mouth daily   atorvastatin (LIPITOR) 40 MG tablet   No No   Sig: TAKE 1 TABLET(40 MG) BY MOUTH DAILY   carvedilol (COREG) 3.125 MG tablet   No No   Sig: TAKE 1 TABLET(3.125 MG) BY MOUTH TWICE DAILY WITH MEALS   clopidogrel (PLAVIX) 75 MG tablet   No No   Sig: Take 1 tablet (75 mg) by mouth daily   nitroGLYcerin (NITROSTAT) 0.4 MG sublingual tablet   No No   Sig: Place 1 tablet (0.4 mg) under the tongue every 5 minutes as needed for chest pain   sertraline (ZOLOFT) 100 MG tablet   No No   Sig: TAKE 1 TABLET(100 MG) BY MOUTH DAILY   ursodiol (ACTIGALL) 300 MG capsule   No No   Sig: Take 1 capsule (300 mg) by mouth 2 times daily   valsartan (DIOVAN) 40 MG tablet   No No   Sig: Take 1 tablet (40 mg) by mouth daily   vitamin D3 (CHOLECALCIFEROL) 2000 units (50 mcg) tablet  Self No No   Sig: Take 1 tablet (2,000 Units) by mouth daily      Facility-Administered Medications: None     Allergies   Allergies   Allergen Reactions     Latex Other (See Comments) and Shortness Of Breath     Runny nose  PN: LW Reaction: DIFFICULTY BREATHING       Flagyl [Metronidazole Hcl] Anaphylaxis and Rash     Food      PN: LW FI1: nka LW FI2:     Hydrochlorothiazide  W/Triamterene      PN: LW Reaction: skin rash     No Clinical Screening - See Comments      PN: LW Other1: -Adhesive Tape     Sulfa Drugs Anaphylaxis, Hives and Itching     PN: LW Reaction: HIVES, Swelling     Tape [Adhesive Tape] Hives     Metoprolol Itching and Rash     Metronidazole Hives and Rash     PN: LW Reaction: HIVES         Social History   I have reviewed this patient's social history and updated it with pertinent information if needed. Gem Gama  reports that she has never smoked. She has never used smokeless tobacco. She reports that she does not drink alcohol and does not use drugs.    Family History   I have reviewed this patient's family history and updated it with pertinent information if needed.   Family History   Problem Relation Age of Onset     Hypertension Mother      Arthritis Mother      Cerebrovascular Disease Father         Three Strokes     Diabetes Father         and brother     Thyroid Disease Sister         Hypothyroid     Multiple Sclerosis Daughter      Sjogren's Daughter        Review of Systems   The 10 point Review of Systems is negative other than noted in the HPI or here.    Physical Exam   Temp: 99.7  F (37.6  C) Temp src: Oral BP: 96/55 Pulse: 82   Resp: 11 SpO2: 95 % O2 Device: None (Room air)    Vital Signs with Ranges  Temp:  [99.7  F (37.6  C)-102.5  F (39.2  C)] 99.7  F (37.6  C)  Pulse:  [69-86] 82  Resp:  [8-21] 11  BP: ()/(53-87) 96/55  SpO2:  [94 %-99 %] 95 %  180 lbs 0 oz    Constitutional: Alert, NAD, pleasant and interactive  Eyes: PERRL, sclerae anicteric  HEENT: mmm, atraumatic  Respiratory: Lungs CTAB, no wheezes or crackles  No chest wall tenderness to palpation  Cardiovascular: RRR, no murmurs  no LE edema  GI: soft, non-tender, nondistended  Skin/Integument: warm, dry, no acute rashes  Genitourinary: not examined  Musc: FALCON, normal limb strength x 4  Neuro: AOx3, no focal deficits, no tremors  Psych: not anxious, not confused      Data   Data  reviewed today:  I personally reviewed: Head CT negative for any acute process.  Chest x-ray normal.  Recent Labs   Lab 09/23/21  2241 09/23/21  1905 09/23/21  1832   WBC  --  8.6  --    HGB  --  13.1  --    MCV  --  86  --    PLT  --  133*  --    NA  --   --  137   POTASSIUM  --   --  3.9   CHLORIDE  --   --  106   CO2  --   --  25   BUN  --   --  15   CR  --   --  1.06*   ANIONGAP  --   --  6   FRACISCO  --   --  8.1*   GLC  --   --  97   ALBUMIN  --   --  3.4   PROTTOTAL  --   --  7.3   BILITOTAL  --   --  0.9   ALKPHOS  --   --  93   ALT  --   --  25   AST  --   --  23   LIPASE  --   --  107   TROPONIN <0.015  --  <0.015       Imaging:  Recent Results (from the past 24 hour(s))   XR Chest Port 1 View    Narrative    EXAM: XR CHEST PORT 1 VIEW  LOCATION: [facility]  DATE/TIME: 9/23/2021 6:55 PM    INDICATION: fever, chest pain  COMPARISON: 12/09/2020      Impression    IMPRESSION: Negative chest.   Head CT w/o contrast    Narrative    EXAM: CT HEAD W/O CONTRAST  LOCATION: [facility]  DATE/TIME: 9/23/2021 10:45 PM    INDICATION: Dizziness, non-specific  COMPARISON: MRI brain 4/29/2014  TECHNIQUE: Routine CT Head without IV contrast. Multiplanar reformats. Dose reduction techniques were used.    FINDINGS:  INTRACRANIAL CONTENTS: No intracranial hemorrhage, extraaxial collection, or mass effect.  No CT evidence of acute infarct. Normal parenchymal attenuation. Normal ventricles and sulci.     VISUALIZED ORBITS/SINUSES/MASTOIDS: No intraorbital abnormality. No paranasal sinus mucosal disease. No middle ear or mastoid effusion.    BONES/SOFT TISSUES: No acute abnormality.      Impression    IMPRESSION:  1.  No acute intracranial process.      yes

## 2021-09-30 ENCOUNTER — APPOINTMENT (OUTPATIENT)
Dept: ORTHOPEDIC SURGERY | Facility: CLINIC | Age: 51
End: 2021-09-30
Payer: COMMERCIAL

## 2021-09-30 VITALS — BODY MASS INDEX: 31.58 KG/M2 | WEIGHT: 185 LBS | HEIGHT: 64 IN

## 2021-09-30 PROCEDURE — 73562 X-RAY EXAM OF KNEE 3: CPT | Mod: RT

## 2021-09-30 PROCEDURE — 20610 DRAIN/INJ JOINT/BURSA W/O US: CPT | Mod: RT

## 2021-09-30 PROCEDURE — 99204 OFFICE O/P NEW MOD 45 MIN: CPT | Mod: 25

## 2021-10-29 ENCOUNTER — APPOINTMENT (OUTPATIENT)
Dept: ORTHOPEDIC SURGERY | Facility: CLINIC | Age: 51
End: 2021-10-29
Payer: COMMERCIAL

## 2021-10-29 DIAGNOSIS — M17.0 BILATERAL PRIMARY OSTEOARTHRITIS OF KNEE: ICD-10-CM

## 2021-10-29 DIAGNOSIS — M25.551 PAIN IN RIGHT HIP: ICD-10-CM

## 2021-10-29 PROCEDURE — 99214 OFFICE O/P EST MOD 30 MIN: CPT

## 2021-10-29 PROCEDURE — 73502 X-RAY EXAM HIP UNI 2-3 VIEWS: CPT | Mod: RT

## 2021-10-29 RX ORDER — HYALURONATE SODIUM, STABILIZED 88 MG/4 ML
88 SYRINGE (ML) INTRAARTICULAR
Qty: 2 | Refills: 0 | Status: ACTIVE | COMMUNITY
Start: 2021-10-29

## 2022-09-06 NOTE — BRIEF OPERATIVE NOTE - ANESTHESIOLOGIST NAME
Detail Level: Simple Additional Notes: Patient consent was obtained to proceed with the visit and recommended plan of care after discussion of all risks and benefits, including the risks of COVID-19 exposure. Tiff

## 2022-09-07 NOTE — DISCHARGE NOTE ADULT - MEDICATION SUMMARY - MEDICATIONS TO TAKE
Patient attended Phase 2 Cardiac Rehab Exercise Session. Further documentation will be completed in [facility] and will be scanned into the medical record upon discharge.     I will START or STAY ON the medications listed below when I get home from the hospital:    Tylenol 325 mg oral tablet  -- 2 tab(s) by mouth every 4 hours   -- This product contains acetaminophen.  Do not use  with any other product containing acetaminophen to prevent possible liver damage.    -- Indication: For pain    Percocet 5/325 oral tablet  -- 1 tab(s) by mouth every 6 hours MDD:4  -- Caution federal law prohibits the transfer of this drug to any person other  than the person for whom it was prescribed.  May cause drowsiness.  Alcohol may intensify this effect.  Use care when operating dangerous machinery.  This prescription cannot be refilled.  This product contains acetaminophen.  Do not use  with any other product containing acetaminophen to prevent possible liver damage.  Using more of this medication than prescribed may cause serious breathing problems.    -- Indication: For severe pain I will START or STAY ON the medications listed below when I get home from the hospital:    Tylenol 325 mg oral tablet  -- 2 tab(s) by mouth every 4 hours   -- This product contains acetaminophen.  Do not use  with any other product containing acetaminophen to prevent possible liver damage.    -- Indication: For pain    Percocet 5/325 oral tablet  -- 1 tab(s) by mouth every 6 hours MDD:4  -- Caution federal law prohibits the transfer of this drug to any person other  than the person for whom it was prescribed.  May cause drowsiness.  Alcohol may intensify this effect.  Use care when operating dangerous machinery.  This prescription cannot be refilled.  This product contains acetaminophen.  Do not use  with any other product containing acetaminophen to prevent possible liver damage.  Using more of this medication than prescribed may cause serious breathing problems.    -- Indication: For severe pain    Lovenox 40 mg/0.4 mL injectable solution  -- Indication: For history of DVT

## 2022-12-05 NOTE — BRIEF OPERATIVE NOTE - SPECIMENS
Quality 226: Preventive Care And Screening: Tobacco Use: Screening And Cessation Intervention: Patient screened for tobacco use and is an ex/non-smoker uterus, right fallopian tube, remnant of left fallopian tube, myoma Detail Level: Generalized Quality 130: Documentation Of Current Medications In The Medical Record: Current Medications Documented Quality 431: Preventive Care And Screening: Unhealthy Alcohol Use - Screening: Patient not identified as an unhealthy alcohol user when screened for unhealthy alcohol use using a systematic screening method Quality 110: Preventive Care And Screening: Influenza Immunization: Influenza Immunization Ordered or Recommended, but not Administered due to system reason

## 2023-08-03 ENCOUNTER — EMERGENCY (EMERGENCY)
Facility: HOSPITAL | Age: 53
LOS: 1 days | Discharge: HOME | End: 2023-08-03
Attending: EMERGENCY MEDICINE | Admitting: EMERGENCY MEDICINE
Payer: COMMERCIAL

## 2023-08-03 VITALS
DIASTOLIC BLOOD PRESSURE: 74 MMHG | OXYGEN SATURATION: 98 % | SYSTOLIC BLOOD PRESSURE: 124 MMHG | HEART RATE: 85 BPM | RESPIRATION RATE: 18 BRPM | TEMPERATURE: 98 F

## 2023-08-03 VITALS
HEART RATE: 80 BPM | SYSTOLIC BLOOD PRESSURE: 110 MMHG | DIASTOLIC BLOOD PRESSURE: 68 MMHG | OXYGEN SATURATION: 100 % | TEMPERATURE: 98 F | RESPIRATION RATE: 16 BRPM

## 2023-08-03 DIAGNOSIS — Z98.890 OTHER SPECIFIED POSTPROCEDURAL STATES: Chronic | ICD-10-CM

## 2023-08-03 LAB
ALBUMIN SERPL ELPH-MCNC: 4.3 G/DL — SIGNIFICANT CHANGE UP (ref 3.3–5)
ALP SERPL-CCNC: 77 U/L — SIGNIFICANT CHANGE UP (ref 40–120)
ALT FLD-CCNC: 16 U/L — SIGNIFICANT CHANGE UP (ref 4–33)
ANION GAP SERPL CALC-SCNC: 15 MMOL/L — HIGH (ref 7–14)
APPEARANCE UR: CLEAR — SIGNIFICANT CHANGE UP
AST SERPL-CCNC: 21 U/L — SIGNIFICANT CHANGE UP (ref 4–32)
BACTERIA # UR AUTO: NEGATIVE /HPF — SIGNIFICANT CHANGE UP
BASE EXCESS BLDV CALC-SCNC: -0.2 MMOL/L — SIGNIFICANT CHANGE UP (ref -2–3)
BASOPHILS # BLD AUTO: 0.03 K/UL — SIGNIFICANT CHANGE UP (ref 0–0.2)
BASOPHILS NFR BLD AUTO: 0.4 % — SIGNIFICANT CHANGE UP (ref 0–2)
BILIRUB SERPL-MCNC: 0.3 MG/DL — SIGNIFICANT CHANGE UP (ref 0.2–1.2)
BILIRUB UR-MCNC: NEGATIVE — SIGNIFICANT CHANGE UP
BLOOD GAS VENOUS COMPREHENSIVE RESULT: SIGNIFICANT CHANGE UP
BUN SERPL-MCNC: 13 MG/DL — SIGNIFICANT CHANGE UP (ref 7–23)
CALCIUM SERPL-MCNC: 9.6 MG/DL — SIGNIFICANT CHANGE UP (ref 8.4–10.5)
CAST: 0 /LPF — SIGNIFICANT CHANGE UP (ref 0–4)
CHLORIDE BLDV-SCNC: 102 MMOL/L — SIGNIFICANT CHANGE UP (ref 96–108)
CHLORIDE SERPL-SCNC: 101 MMOL/L — SIGNIFICANT CHANGE UP (ref 98–107)
CO2 BLDV-SCNC: 27.3 MMOL/L — HIGH (ref 22–26)
CO2 SERPL-SCNC: 20 MMOL/L — LOW (ref 22–31)
COLOR SPEC: YELLOW — SIGNIFICANT CHANGE UP
CREAT SERPL-MCNC: 0.89 MG/DL — SIGNIFICANT CHANGE UP (ref 0.5–1.3)
DIFF PNL FLD: NEGATIVE — SIGNIFICANT CHANGE UP
EGFR: 77 ML/MIN/1.73M2 — SIGNIFICANT CHANGE UP
EOSINOPHIL # BLD AUTO: 0 K/UL — SIGNIFICANT CHANGE UP (ref 0–0.5)
EOSINOPHIL NFR BLD AUTO: 0 % — SIGNIFICANT CHANGE UP (ref 0–6)
GAS PNL BLDV: 135 MMOL/L — LOW (ref 136–145)
GAS PNL BLDV: SIGNIFICANT CHANGE UP
GLUCOSE BLDV-MCNC: 152 MG/DL — HIGH (ref 70–99)
GLUCOSE SERPL-MCNC: 153 MG/DL — HIGH (ref 70–99)
GLUCOSE UR QL: NEGATIVE MG/DL — SIGNIFICANT CHANGE UP
HCO3 BLDV-SCNC: 26 MMOL/L — SIGNIFICANT CHANGE UP (ref 22–29)
HCT VFR BLD CALC: 38.5 % — SIGNIFICANT CHANGE UP (ref 34.5–45)
HCT VFR BLDA CALC: 40 % — SIGNIFICANT CHANGE UP (ref 34.5–46.5)
HGB BLD CALC-MCNC: 13.2 G/DL — SIGNIFICANT CHANGE UP (ref 11.7–16.1)
HGB BLD-MCNC: 12.8 G/DL — SIGNIFICANT CHANGE UP (ref 11.5–15.5)
IANC: 7.19 K/UL — SIGNIFICANT CHANGE UP (ref 1.8–7.4)
IMM GRANULOCYTES NFR BLD AUTO: 0.5 % — SIGNIFICANT CHANGE UP (ref 0–0.9)
KETONES UR-MCNC: 15 MG/DL
LACTATE BLDV-MCNC: 1.5 MMOL/L — SIGNIFICANT CHANGE UP (ref 0.5–2)
LEUKOCYTE ESTERASE UR-ACNC: NEGATIVE — SIGNIFICANT CHANGE UP
LIDOCAIN IGE QN: 43 U/L — SIGNIFICANT CHANGE UP (ref 7–60)
LYMPHOCYTES # BLD AUTO: 0.89 K/UL — LOW (ref 1–3.3)
LYMPHOCYTES # BLD AUTO: 10.5 % — LOW (ref 13–44)
MCHC RBC-ENTMCNC: 30.3 PG — SIGNIFICANT CHANGE UP (ref 27–34)
MCHC RBC-ENTMCNC: 33.2 GM/DL — SIGNIFICANT CHANGE UP (ref 32–36)
MCV RBC AUTO: 91 FL — SIGNIFICANT CHANGE UP (ref 80–100)
MONOCYTES # BLD AUTO: 0.36 K/UL — SIGNIFICANT CHANGE UP (ref 0–0.9)
MONOCYTES NFR BLD AUTO: 4.2 % — SIGNIFICANT CHANGE UP (ref 2–14)
NEUTROPHILS # BLD AUTO: 7.19 K/UL — SIGNIFICANT CHANGE UP (ref 1.8–7.4)
NEUTROPHILS NFR BLD AUTO: 84.4 % — HIGH (ref 43–77)
NITRITE UR-MCNC: NEGATIVE — SIGNIFICANT CHANGE UP
NRBC # BLD: 0 /100 WBCS — SIGNIFICANT CHANGE UP (ref 0–0)
NRBC # FLD: 0.02 K/UL — HIGH (ref 0–0)
PCO2 BLDV: 47 MMHG — SIGNIFICANT CHANGE UP (ref 39–52)
PH BLDV: 7.35 — SIGNIFICANT CHANGE UP (ref 7.32–7.43)
PH UR: 7 — SIGNIFICANT CHANGE UP (ref 5–8)
PLATELET # BLD AUTO: 256 K/UL — SIGNIFICANT CHANGE UP (ref 150–400)
PO2 BLDV: 28 MMHG — SIGNIFICANT CHANGE UP (ref 25–45)
POTASSIUM BLDV-SCNC: 3.7 MMOL/L — SIGNIFICANT CHANGE UP (ref 3.5–5.1)
POTASSIUM SERPL-MCNC: 4 MMOL/L — SIGNIFICANT CHANGE UP (ref 3.5–5.3)
POTASSIUM SERPL-SCNC: 4 MMOL/L — SIGNIFICANT CHANGE UP (ref 3.5–5.3)
PROT SERPL-MCNC: 7.8 G/DL — SIGNIFICANT CHANGE UP (ref 6–8.3)
PROT UR-MCNC: NEGATIVE MG/DL — SIGNIFICANT CHANGE UP
RBC # BLD: 4.23 M/UL — SIGNIFICANT CHANGE UP (ref 3.8–5.2)
RBC # FLD: 11.9 % — SIGNIFICANT CHANGE UP (ref 10.3–14.5)
RBC CASTS # UR COMP ASSIST: 2 /HPF — SIGNIFICANT CHANGE UP (ref 0–4)
SAO2 % BLDV: 48 % — LOW (ref 67–88)
SODIUM SERPL-SCNC: 136 MMOL/L — SIGNIFICANT CHANGE UP (ref 135–145)
SP GR SPEC: 1.02 — SIGNIFICANT CHANGE UP (ref 1–1.03)
SQUAMOUS # UR AUTO: 0 /HPF — SIGNIFICANT CHANGE UP (ref 0–5)
UROBILINOGEN FLD QL: 0.2 MG/DL — SIGNIFICANT CHANGE UP (ref 0.2–1)
WBC # BLD: 8.51 K/UL — SIGNIFICANT CHANGE UP (ref 3.8–10.5)
WBC # FLD AUTO: 8.51 K/UL — SIGNIFICANT CHANGE UP (ref 3.8–10.5)
WBC UR QL: 1 /HPF — SIGNIFICANT CHANGE UP (ref 0–5)

## 2023-08-03 PROCEDURE — 99285 EMERGENCY DEPT VISIT HI MDM: CPT

## 2023-08-03 PROCEDURE — 74177 CT ABD & PELVIS W/CONTRAST: CPT | Mod: 26,ME

## 2023-08-03 PROCEDURE — 93010 ELECTROCARDIOGRAM REPORT: CPT

## 2023-08-03 PROCEDURE — G1004: CPT

## 2023-08-03 RX ORDER — MORPHINE SULFATE 50 MG/1
4 CAPSULE, EXTENDED RELEASE ORAL ONCE
Refills: 0 | Status: DISCONTINUED | OUTPATIENT
Start: 2023-08-03 | End: 2023-08-03

## 2023-08-03 RX ORDER — SODIUM CHLORIDE 9 MG/ML
1000 INJECTION, SOLUTION INTRAVENOUS ONCE
Refills: 0 | Status: COMPLETED | OUTPATIENT
Start: 2023-08-03 | End: 2023-08-03

## 2023-08-03 RX ORDER — ONDANSETRON 8 MG/1
4 TABLET, FILM COATED ORAL ONCE
Refills: 0 | Status: COMPLETED | OUTPATIENT
Start: 2023-08-03 | End: 2023-08-03

## 2023-08-03 RX ADMIN — SODIUM CHLORIDE 1000 MILLILITER(S): 9 INJECTION, SOLUTION INTRAVENOUS at 15:26

## 2023-08-03 RX ADMIN — ONDANSETRON 4 MILLIGRAM(S): 8 TABLET, FILM COATED ORAL at 15:27

## 2023-08-03 RX ADMIN — MORPHINE SULFATE 4 MILLIGRAM(S): 50 CAPSULE, EXTENDED RELEASE ORAL at 15:27

## 2023-08-03 NOTE — ED PROVIDER NOTE - NSICDXPASTSURGICALHX_GEN_ALL_CORE_FT
PAST SURGICAL HISTORY:  H/O laparoscopy 1999 for ectopic pregnancy    H/O prior ablation treatment 2016 uterine    S/P excision of lipoma lower back 2016    S/P knee surgery right knee arthroscopy 11/30/2011

## 2023-08-03 NOTE — ED PROVIDER NOTE - CLINICAL SUMMARY MEDICAL DECISION MAKING FREE TEXT BOX
54 y/o female pt c/o diffuse abdominal pain since 9am this morning. Symptomatic treatment at this time, with IV fluids, zofran, morphine. CT Abdomen/pelvis to r/o pancreatitis, colitis, appendicitis.

## 2023-08-03 NOTE — ED PROVIDER NOTE - NSICDXPASTMEDICALHX_GEN_ALL_CORE_FT
PAST MEDICAL HISTORY:  Anemia has been prescribed slow release iron but is non-compliant due to inability to tolerate    DVT (deep venous thrombosis) LLE 2014  hematologist work up was negative for clotting disorder"    GERD (gastroesophageal reflux disease)     Obesity     Pulmonary embolism, bilateral 2014.    Uterine fibroid

## 2023-08-03 NOTE — ED PROVIDER NOTE - NSFOLLOWUPINSTRUCTIONS_ED_ALL_ED_FT
You presented to the ED with abdominal pain today. Your CT scan showed colonic diverticulosis.   And please follow up with a GI specialist within this week. If you are experiencing any fevers, rectal bleeding, increased abdominal pain please come back to the ED immediately.    Diverticulosis    Diverticulosis is a condition that develops when small pouches (diverticula) form in the wall of the large intestine (colon). The colon is where water is absorbed and stool (feces) is formed. The pouches form when the inside layer of the colon pushes through weak spots in the outer layers of the colon. You may have a few pouches or many of them.    The pouches usually do not cause problems unless they become inflamed or infected. When this happens, the condition is called diverticulitis.    What are the causes?  The cause of this condition is not known.    What increases the risk?  The following factors may make you more likely to develop this condition:  Being older than age 60. Your risk for this condition increases with age. Diverticulosis is rare among people younger than age 30. By age 80, many people have it.  Eating a low-fiber diet.  Having frequent constipation.  Being overweight.  Not getting enough exercise.  Smoking.  Taking over-the-counter pain medicines, like aspirin and ibuprofen.  Having a family history of diverticulosis.  What are the signs or symptoms?  In most people, there are no symptoms of this condition. If you do have symptoms, they may include:  Bloating.  Cramps in the abdomen.  Constipation or diarrhea.  Pain in the lower left side of the abdomen.  How is this diagnosed?  Because diverticulosis usually has no symptoms, it is most often diagnosed during an exam for other colon problems. The condition may be diagnosed by:  Using a flexible scope to examine the colon (colonoscopy).  Taking an X-ray of the colon after dye has been put into the colon (barium enema).  Having a CT scan.  How is this treated?    You may not need treatment for this condition. Your health care provider may recommend treatment to prevent problems. You may need treatment if you have symptoms or if you previously had diverticulitis. Treatment may include:  Eating a high-fiber diet.  Taking a fiber supplement.  Taking a live bacteria supplement (probiotic).  Taking medicine to relax your colon.  Follow these instructions at home:  Medicines    Take over-the-counter and prescription medicines only as told by your health care provider.  If told by your health care provider, take a fiber supplement or probiotic.  Constipation prevention      Your condition may cause constipation. To prevent or treat constipation, you may need to:  Drink enough fluid to keep your urine pale yellow.  Take over-the-counter or prescription medicines.  Eat foods that are high in fiber, such as beans, whole grains, and fresh fruits and vegetables.  Limit foods that are high in fat and processed sugars, such as fried or sweet foods.    General instructions    Try not to strain when you have a bowel movement.  Keep all follow-up visits as told by your health care provider. This is important.  Contact a health care provider if you:  Have pain in your abdomen.  Have bloating.  Have cramps.  Have not had a bowel movement in 3 days.  Get help right away if:  Your pain gets worse.  Your bloating becomes very bad.  You have a fever or chills, and your symptoms suddenly get worse.  You vomit.  You have bowel movements that are bloody or black.  You have bleeding from your rectum.      Summary  Diverticulosis is a condition that develops when small pouches (diverticula) form in the wall of the large intestine (colon).  You may have a few pouches or many of them.  This condition is most often diagnosed during an exam for other colon problems.  Treatment may include increasing the fiber in your diet, taking supplements, or taking medicines.  This information is not intended to replace advice given to you by your health care provider. Make sure you discuss any questions you have with your health care provider.

## 2023-08-03 NOTE — ED PROVIDER NOTE - NSICDXFAMILYHX_GEN_ALL_CORE_FT
FAMILY HISTORY:  Family history of diabetes mellitus  Family history of heart disease    Sibling  Still living? Yes, Estimated age: Age Unknown  Family history of cancer, Age at diagnosis: Age Unknown    Aunt  Still living? No  Family history of cancer, Age at diagnosis: Age Unknown    Uncle  Still living? No  Family history of throat cancer, Age at diagnosis: Age Unknown

## 2023-08-03 NOTE — ED ADULT NURSE NOTE - OBJECTIVE STATEMENT
pt A&ox4 , coming to ED from home after having severe abdominal pain associated with nausea and non bloody vomit that started today. past medical history: HTN, GERD. pt denies fevers or chills at home. pt denies Chest pain and SOB. pt denies H/A , Dizziness , lightheadedness , and radiating chest pain. breathing is spontaneous and unlabored. sating 99% on RA. abdomen is soft, flat, and tender in all quadrants. bilateral pedal and radial pulses palpable and strong. left 22g forearm IV placed Labs drawn and sent as per ordered. Bed in lowest position, call bell within reach, all other safety and comfort measures provided. awaiting labs results and further orders.

## 2023-08-03 NOTE — ED PROVIDER NOTE - PATIENT PORTAL LINK FT
You can access the FollowMyHealth Patient Portal offered by [facility] by registering at the following website: [email or web address]. By joining [employer]’s FollowMyHealth portal, you will also be able to view your health information using other applications (apps) compatible with our system.

## 2023-08-03 NOTE — ED PROVIDER NOTE - OBJECTIVE STATEMENT
53-year-old female patient complains of diffuse abdominal pain since 9 AM this morning notes vomitting 3-4 times since this morning.  Notes nausea currently.  Denies diarrhea bloody stool, dark stool, bloody vomit, fevers, chills, body aches, flank pain, urinary symptoms.  Patient has never had this before.  Denies history of GERD, appendectomy, cholecystectomy.  Pain started before eating however thinks food makes pain worse.

## 2023-08-03 NOTE — ED ADULT NURSE NOTE - NSFALLUNIVINTERV_ED_ALL_ED
Bed/Stretcher in lowest position, wheels locked, appropriate side rails in place/Call bell, personal items and telephone in reach/Instruct patient to call for assistance before getting out of bed/chair/stretcher/Non-slip footwear applied when patient is off stretcher/[place] to call system/Physically safe environment - no spills, clutter or unnecessary equipment/Purposeful proactive rounding/Room/bathroom lighting operational, light cord in reach

## 2023-08-03 NOTE — ED PROVIDER NOTE - ATTENDING CONTRIBUTION TO CARE
DR. ROBBINS, ATTENDING MD-  I performed a face to face bedside interview with the patient regarding history of present illness, review of symptoms and past medical history. I completed an independent physical exam.  I have discussed the patient's plan of care with the resident.   Documentation as above in the note.    53-year-old female history of ectopic pregnancy uterine fibroids status post hysterectomy DVT PE no longer on anticoagulation here with complaint of diffuse severe abdominal pain, gradual onset this morning, symptoms progressively worsening.  Associated with nausea and vomiting 3-4 episodes since this morning.  No bowel movement today, minimal flatus.  Patient denies dysuria hematuria vaginal bleeding or discharge.  On exam abdomen is soft diffuse tenderness to light palpation with rebound and guarding.  Concern for perforation diverticulitis colitis appendicitis pancreatitis.  Will obtain CBC CMP lipase hCG CT abdomen pelvis give IV fluid bolus pain medication antiemetic and reassess.

## 2023-08-03 NOTE — ED PROVIDER NOTE - NSFOLLOWUPCLINICS_GEN_ALL_ED_FT
[facility]  Gastroenterology  [address] [place] [address] [address]  [place] [address]  Phone: [phone]  Fax:

## 2023-08-03 NOTE — ED ADULT TRIAGE NOTE - CHIEF COMPLAINT QUOTE
pt c.o of diffuse abd pain since this am, denies any nausea or vomiting, denies dysuria, pt appears uncomfortable

## 2023-08-04 LAB
CULTURE RESULTS: SIGNIFICANT CHANGE UP
SPECIMEN SOURCE: SIGNIFICANT CHANGE UP

## 2023-08-04 NOTE — ED POST DISCHARGE NOTE - REASON FOR FOLLOW-UP
Other pt called me concerned that antibiotics were not sent to her pharmacy. there was no infection on lab work. pt was informed of this and will follow up with GI for her diverticulosis.

## 2024-08-08 ENCOUNTER — EMERGENCY (EMERGENCY)
Facility: HOSPITAL | Age: 54
LOS: 1 days | Discharge: HOME | End: 2024-08-08
Attending: EMERGENCY MEDICINE | Admitting: EMERGENCY MEDICINE
Payer: COMMERCIAL

## 2024-08-08 VITALS
RESPIRATION RATE: 16 BRPM | OXYGEN SATURATION: 100 % | HEART RATE: 80 BPM | TEMPERATURE: 98 F | HEIGHT: 64 IN | DIASTOLIC BLOOD PRESSURE: 79 MMHG | WEIGHT: 160.06 LBS | SYSTOLIC BLOOD PRESSURE: 117 MMHG

## 2024-08-08 DIAGNOSIS — Z98.890 OTHER SPECIFIED POSTPROCEDURAL STATES: Chronic | ICD-10-CM

## 2024-08-08 LAB
ADD ON TEST-SPECIMEN IN LAB: SIGNIFICANT CHANGE UP
ALBUMIN SERPL ELPH-MCNC: 4.1 G/DL — SIGNIFICANT CHANGE UP (ref 3.3–5)
ALP SERPL-CCNC: 89 U/L — SIGNIFICANT CHANGE UP (ref 40–120)
ALT FLD-CCNC: 14 U/L — SIGNIFICANT CHANGE UP (ref 4–33)
ANION GAP SERPL CALC-SCNC: 12 MMOL/L — SIGNIFICANT CHANGE UP (ref 7–14)
AST SERPL-CCNC: 14 U/L — SIGNIFICANT CHANGE UP (ref 4–32)
BASOPHILS # BLD AUTO: 0.03 K/UL — SIGNIFICANT CHANGE UP (ref 0–0.2)
BASOPHILS NFR BLD AUTO: 0.6 % — SIGNIFICANT CHANGE UP (ref 0–2)
BILIRUB SERPL-MCNC: 0.5 MG/DL — SIGNIFICANT CHANGE UP (ref 0.2–1.2)
BUN SERPL-MCNC: 13 MG/DL — SIGNIFICANT CHANGE UP (ref 7–23)
CALCIUM SERPL-MCNC: 9.4 MG/DL — SIGNIFICANT CHANGE UP (ref 8.4–10.5)
CHLORIDE SERPL-SCNC: 101 MMOL/L — SIGNIFICANT CHANGE UP (ref 98–107)
CO2 SERPL-SCNC: 24 MMOL/L — SIGNIFICANT CHANGE UP (ref 22–31)
CREAT SERPL-MCNC: 0.85 MG/DL — SIGNIFICANT CHANGE UP (ref 0.5–1.3)
EGFR: 81 ML/MIN/1.73M2 — SIGNIFICANT CHANGE UP
EGFR: 81 ML/MIN/1.73M2 — SIGNIFICANT CHANGE UP
EOSINOPHIL # BLD AUTO: 0.04 K/UL — SIGNIFICANT CHANGE UP (ref 0–0.5)
EOSINOPHIL NFR BLD AUTO: 0.8 % — SIGNIFICANT CHANGE UP (ref 0–6)
GLUCOSE SERPL-MCNC: 79 MG/DL — SIGNIFICANT CHANGE UP (ref 70–99)
HCT VFR BLD CALC: 38.9 % — SIGNIFICANT CHANGE UP (ref 34.5–45)
HGB BLD-MCNC: 13.1 G/DL — SIGNIFICANT CHANGE UP (ref 11.5–15.5)
IANC: 2.1 K/UL — SIGNIFICANT CHANGE UP (ref 1.8–7.4)
IMM GRANULOCYTES NFR BLD AUTO: 0.2 % — SIGNIFICANT CHANGE UP (ref 0–0.9)
LYMPHOCYTES # BLD AUTO: 2.34 K/UL — SIGNIFICANT CHANGE UP (ref 1–3.3)
LYMPHOCYTES # BLD AUTO: 48 % — HIGH (ref 13–44)
MCHC RBC-ENTMCNC: 29.8 PG — SIGNIFICANT CHANGE UP (ref 27–34)
MCHC RBC-ENTMCNC: 33.7 GM/DL — SIGNIFICANT CHANGE UP (ref 32–36)
MCV RBC AUTO: 88.4 FL — SIGNIFICANT CHANGE UP (ref 80–100)
MONOCYTES # BLD AUTO: 0.35 K/UL — SIGNIFICANT CHANGE UP (ref 0–0.9)
MONOCYTES NFR BLD AUTO: 7.2 % — SIGNIFICANT CHANGE UP (ref 2–14)
NEUTROPHILS # BLD AUTO: 2.1 K/UL — SIGNIFICANT CHANGE UP (ref 1.8–7.4)
NEUTROPHILS NFR BLD AUTO: 43.2 % — SIGNIFICANT CHANGE UP (ref 43–77)
NRBC # BLD AUTO: 0 K/UL — SIGNIFICANT CHANGE UP (ref 0–0)
NRBC # BLD: 0 /100 WBCS — SIGNIFICANT CHANGE UP (ref 0–0)
NRBC # FLD: 0 K/UL — SIGNIFICANT CHANGE UP (ref 0–0)
NRBC BLD-RTO: 0 /100 WBCS — SIGNIFICANT CHANGE UP (ref 0–0)
PLATELET # BLD AUTO: 300 K/UL — SIGNIFICANT CHANGE UP (ref 150–400)
POTASSIUM SERPL-MCNC: 4 MMOL/L — SIGNIFICANT CHANGE UP (ref 3.5–5.3)
POTASSIUM SERPL-SCNC: 4 MMOL/L — SIGNIFICANT CHANGE UP (ref 3.5–5.3)
PROT SERPL-MCNC: 8.2 G/DL — SIGNIFICANT CHANGE UP (ref 6–8.3)
RBC # BLD: 4.4 M/UL — SIGNIFICANT CHANGE UP (ref 3.8–5.2)
RBC # FLD: 12 % — SIGNIFICANT CHANGE UP (ref 10.3–14.5)
SODIUM SERPL-SCNC: 137 MMOL/L — SIGNIFICANT CHANGE UP (ref 135–145)
WBC # BLD: 4.87 K/UL — SIGNIFICANT CHANGE UP (ref 3.8–10.5)
WBC # FLD AUTO: 4.87 K/UL — SIGNIFICANT CHANGE UP (ref 3.8–10.5)

## 2024-08-08 PROCEDURE — 99236 HOSP IP/OBS SAME DATE HI 85: CPT

## 2024-08-08 PROCEDURE — 74220 X-RAY XM ESOPHAGUS 1CNTRST: CPT | Mod: 26

## 2024-08-08 PROCEDURE — 99222 1ST HOSP IP/OBS MODERATE 55: CPT | Mod: GC

## 2024-08-08 PROCEDURE — 70490 CT SOFT TISSUE NECK W/O DYE: CPT | Mod: 26,MC

## 2024-08-08 RX ORDER — MAGNESIUM, ALUMINUM HYDROXIDE 200-200 MG
30 TABLET,CHEWABLE ORAL EVERY 4 HOURS
Refills: 0 | Status: DISCONTINUED | OUTPATIENT
Start: 2024-08-08 | End: 2024-08-11

## 2024-08-08 RX ADMIN — Medication 150 MILLILITER(S): at 20:25

## 2024-08-08 RX ADMIN — Medication 20 MILLIGRAM(S): at 08:45

## 2024-08-08 RX ADMIN — Medication 20 MILLIGRAM(S): at 18:16

## 2024-08-08 RX ADMIN — Medication 1000 MILLILITER(S): at 13:30

## 2024-08-09 VITALS
SYSTOLIC BLOOD PRESSURE: 104 MMHG | DIASTOLIC BLOOD PRESSURE: 66 MMHG | HEART RATE: 74 BPM | OXYGEN SATURATION: 100 % | RESPIRATION RATE: 16 BRPM | TEMPERATURE: 98 F

## 2024-08-09 PROCEDURE — 99232 SBSQ HOSP IP/OBS MODERATE 35: CPT

## 2024-08-09 PROCEDURE — 99239 HOSP IP/OBS DSCHRG MGMT >30: CPT

## 2024-08-09 PROCEDURE — 74230 X-RAY XM SWLNG FUNCJ C+: CPT | Mod: 26

## 2024-08-09 RX ADMIN — Medication 40 MILLIGRAM(S): at 10:54

## 2024-09-06 NOTE — PATIENT PROFILE ADULT. - [facility]
Problem: Adult Inpatient Plan of Care  Goal: Plan of Care Review  Description: The Plan of Care Review/Shift note should be completed every shift.  The Outcome Evaluation is a brief statement about your assessment that the patient is improving, declining, or no change.  This information will be displayed automatically on your shift  note.  Outcome: Progressing     Problem: Adult Inpatient Plan of Care  Goal: Absence of Hospital-Acquired Illness or Injury  Outcome: Progressing  Intervention: Identify and Manage Fall Risk  Recent Flowsheet Documentation  Taken 9/6/2024 0335 by Juana Hernández RN  Safety Promotion/Fall Prevention:   activity supervised   assistive device/personal items within reach   clutter free environment maintained   nonskid shoes/slippers when out of bed   patient and family education   room organization consistent   safety round/check completed  Taken 9/5/2024 2354 by Juana Hernández RN  Safety Promotion/Fall Prevention:   activity supervised   assistive device/personal items within reach   clutter free environment maintained   nonskid shoes/slippers when out of bed   patient and family education   room organization consistent   safety round/check completed  Intervention: Prevent Skin Injury  Recent Flowsheet Documentation  Taken 9/5/2024 2354 by Juana Hernández RN  Body Position: refuses positioning  Device Skin Pressure Protection: absorbent pad utilized/changed  Intervention: Prevent and Manage VTE (Venous Thromboembolism) Risk  Recent Flowsheet Documentation  Taken 9/6/2024 0335 by Juana Hernández RN  VTE Prevention/Management: SCDs on (sequential compression devices)  Taken 9/5/2024 2354 by Juana Hernández RN  VTE Prevention/Management: SCDs on (sequential compression devices)     Problem: Adult Inpatient Plan of Care  Goal: Optimal Comfort and Wellbeing  Outcome: Progressing  Intervention: Provide Person-Centered Care  Recent Flowsheet Documentation  Taken 9/5/2024 2354 by Juana Hernández RN  Trust  Relationship/Rapport: care explained     Goal Outcome Evaluation:       Pt a&o x4, but lethargic. Arouses to voice. No complaints of pain or SOB. On 3LPM NC overnight. Repositioned overnight as patient allowed. Good urine output via paiz. Continues to have lasix and dobutamine drips.                   (4) no limitation